# Patient Record
Sex: MALE | Race: WHITE | Employment: OTHER | URBAN - METROPOLITAN AREA
[De-identification: names, ages, dates, MRNs, and addresses within clinical notes are randomized per-mention and may not be internally consistent; named-entity substitution may affect disease eponyms.]

---

## 2022-07-15 ENCOUNTER — HOSPITAL ENCOUNTER (OUTPATIENT)
Age: 72
Setting detail: SPECIMEN
Discharge: HOME | End: 2022-07-15
Payer: OTHER GOVERNMENT

## 2022-07-15 LAB
ALBUMIN SERPL-MCNC: 4.8 GM/DL (ref 3.4–5)
ALP BLD-CCNC: 73 IU/L (ref 40–128)
ALT SERPL-CCNC: <5 U/L (ref 10–40)
ANION GAP SERPL CALCULATED.3IONS-SCNC: 15 MMOL/L (ref 4–16)
AST SERPL-CCNC: <5 IU/L (ref 15–37)
BILIRUB SERPL-MCNC: 0.4 MG/DL (ref 0–1)
BUN BLDV-MCNC: 23 MG/DL (ref 6–23)
CALCIUM SERPL-MCNC: 9.8 MG/DL (ref 8.3–10.6)
CHLORIDE BLD-SCNC: 96 MMOL/L (ref 99–110)
CHOLESTEROL: 689 MG/DL
CO2: 21 MMOL/L (ref 21–32)
CREAT SERPL-MCNC: 1.9 MG/DL (ref 0.9–1.3)
GFR AFRICAN AMERICAN: 43 ML/MIN/1.73M2
GFR NON-AFRICAN AMERICAN: 35 ML/MIN/1.73M2
GLUCOSE BLD-MCNC: 97 MG/DL (ref 70–99)
HDLC SERPL-MCNC: 11 MG/DL
LDL CHOLESTEROL CALCULATED: ABNORMAL MG/DL
LDL CHOLESTEROL DIRECT: 66 MG/DL
POTASSIUM SERPL-SCNC: 3.5 MMOL/L (ref 3.5–5.1)
SODIUM BLD-SCNC: 132 MMOL/L (ref 135–145)
TOTAL PROTEIN: 7.4 GM/DL (ref 6.4–8.2)
TRIGL SERPL-MCNC: 2690 MG/DL

## 2022-07-15 PROCEDURE — 80061 LIPID PANEL: CPT

## 2022-07-15 PROCEDURE — 80053 COMPREHEN METABOLIC PANEL: CPT

## 2022-07-15 PROCEDURE — 83721 ASSAY OF BLOOD LIPOPROTEIN: CPT

## 2022-07-20 ENCOUNTER — HOSPITAL ENCOUNTER (OUTPATIENT)
Age: 72
Setting detail: SPECIMEN
Discharge: HOME | End: 2022-07-20

## 2022-07-20 LAB
AMPHETAMINES: NEGATIVE
BARBITURATE SCREEN URINE: NEGATIVE
BENZODIAZEPINE SCREEN, URINE: NEGATIVE
CANNABINOID SCREEN URINE: ABNORMAL
COCAINE METABOLITE: NEGATIVE
OPIATES, URINE: NEGATIVE
OXYCODONE: NEGATIVE
PHENCYCLIDINE, URINE: NEGATIVE

## 2022-07-20 PROCEDURE — 80307 DRUG TEST PRSMV CHEM ANLYZR: CPT

## 2022-08-10 ENCOUNTER — HOSPITAL ENCOUNTER (OUTPATIENT)
Age: 72
Setting detail: SPECIMEN
Discharge: HOME | End: 2022-08-10
Payer: OTHER GOVERNMENT

## 2022-08-10 LAB
ALBUMIN SERPL-MCNC: 4.5 GM/DL (ref 3.4–5)
ALP BLD-CCNC: 68 IU/L (ref 40–128)
ALT SERPL-CCNC: 7 U/L (ref 10–40)
ANION GAP SERPL CALCULATED.3IONS-SCNC: 18 MMOL/L (ref 4–16)
AST SERPL-CCNC: 12 IU/L (ref 15–37)
BASOPHILS ABSOLUTE: 0.1 K/CU MM
BASOPHILS RELATIVE PERCENT: 1 % (ref 0–1)
BILIRUB SERPL-MCNC: 0.2 MG/DL (ref 0–1)
BUN BLDV-MCNC: 24 MG/DL (ref 6–23)
CALCIUM SERPL-MCNC: 9.6 MG/DL (ref 8.3–10.6)
CHLORIDE BLD-SCNC: 101 MMOL/L (ref 99–110)
CHOLESTEROL: 494 MG/DL
CO2: 16 MMOL/L (ref 21–32)
CREAT SERPL-MCNC: 2.1 MG/DL (ref 0.9–1.3)
DIFFERENTIAL TYPE: ABNORMAL
EOSINOPHILS ABSOLUTE: 0.2 K/CU MM
EOSINOPHILS RELATIVE PERCENT: 2.4 % (ref 0–3)
ESTIMATED AVERAGE GLUCOSE: 111 MG/DL
GFR AFRICAN AMERICAN: 38 ML/MIN/1.73M2
GFR NON-AFRICAN AMERICAN: 31 ML/MIN/1.73M2
GLUCOSE BLD-MCNC: 92 MG/DL (ref 70–99)
HBA1C MFR BLD: 5.5 % (ref 4.2–6.3)
HCT VFR BLD CALC: 43 % (ref 42–52)
HDLC SERPL-MCNC: 10 MG/DL
HEMOGLOBIN: 12.9 GM/DL (ref 13.5–18)
IMMATURE NEUTROPHIL %: 0.7 % (ref 0–0.43)
LDL CHOLESTEROL CALCULATED: ABNORMAL MG/DL
LYMPHOCYTES ABSOLUTE: 2.1 K/CU MM
LYMPHOCYTES RELATIVE PERCENT: 24.4 % (ref 24–44)
MCH RBC QN AUTO: 30.8 PG (ref 27–31)
MCHC RBC AUTO-ENTMCNC: 30 % (ref 32–36)
MCV RBC AUTO: 102.6 FL (ref 78–100)
MONOCYTES ABSOLUTE: 0.7 K/CU MM
MONOCYTES RELATIVE PERCENT: 8.3 % (ref 0–4)
NUCLEATED RBC %: 0 %
PDW BLD-RTO: 14.6 % (ref 11.7–14.9)
PLATELET # BLD: 328 K/CU MM (ref 140–440)
PMV BLD AUTO: 9.9 FL (ref 7.5–11.1)
POTASSIUM SERPL-SCNC: 3.9 MMOL/L (ref 3.5–5.1)
PROSTATE SPECIFIC ANTIGEN: 4.6 NG/ML (ref 0–4)
RBC # BLD: 4.19 M/CU MM (ref 4.6–6.2)
SEGMENTED NEUTROPHILS ABSOLUTE COUNT: 5.5 K/CU MM
SEGMENTED NEUTROPHILS RELATIVE PERCENT: 63.2 % (ref 36–66)
SODIUM BLD-SCNC: 135 MMOL/L (ref 135–145)
TOTAL IMMATURE NEUTOROPHIL: 0.06 K/CU MM
TOTAL NUCLEATED RBC: 0 K/CU MM
TOTAL PROTEIN: 7.3 GM/DL (ref 6.4–8.2)
TRIGL SERPL-MCNC: 1949 MG/DL
WBC # BLD: 8.8 K/CU MM (ref 4–10.5)

## 2022-08-10 PROCEDURE — 85025 COMPLETE CBC W/AUTO DIFF WBC: CPT

## 2022-08-10 PROCEDURE — G0103 PSA SCREENING: HCPCS

## 2022-08-10 PROCEDURE — 83721 ASSAY OF BLOOD LIPOPROTEIN: CPT

## 2022-08-10 PROCEDURE — 80053 COMPREHEN METABOLIC PANEL: CPT

## 2022-08-10 PROCEDURE — 83036 HEMOGLOBIN GLYCOSYLATED A1C: CPT

## 2022-08-10 PROCEDURE — 80061 LIPID PANEL: CPT

## 2022-08-11 ENCOUNTER — HOSPITAL ENCOUNTER (OUTPATIENT)
Age: 72
Setting detail: SPECIMEN
Discharge: HOME | End: 2022-08-11
Payer: OTHER GOVERNMENT

## 2022-08-11 LAB
BACTERIA: NEGATIVE /HPF
BILIRUBIN URINE: NEGATIVE MG/DL
BLOOD, URINE: NEGATIVE
CLARITY: CLEAR
COLOR: YELLOW
GLUCOSE, URINE: NEGATIVE MG/DL
HYALINE CASTS: 2 /LPF
KETONES, URINE: NEGATIVE MG/DL
LEUKOCYTE ESTERASE, URINE: NEGATIVE
MUCUS: ABNORMAL HPF
NITRITE URINE, QUANTITATIVE: NEGATIVE
PH, URINE: 5.5 (ref 5–8)
PROTEIN UA: 100 MG/DL
RBC URINE: 1 /HPF (ref 0–3)
SPECIFIC GRAVITY UA: 1.02 (ref 1–1.03)
SQUAMOUS EPITHELIAL: <1 /HPF
TRICHOMONAS: ABNORMAL /HPF
UROBILINOGEN, URINE: 0.2 MG/DL (ref 0.2–1)
WBC UA: 3 /HPF (ref 0–2)

## 2022-08-11 PROCEDURE — 81001 URINALYSIS AUTO W/SCOPE: CPT

## 2022-08-12 LAB — LDL CHOLESTEROL DIRECT: 53 MG/DL

## 2022-08-17 ENCOUNTER — HOSPITAL ENCOUNTER (OUTPATIENT)
Age: 72
Setting detail: SPECIMEN
Discharge: HOME | End: 2022-08-17

## 2022-08-17 LAB
AMPHETAMINES: NEGATIVE
BARBITURATE SCREEN URINE: NEGATIVE
BENZODIAZEPINE SCREEN, URINE: NEGATIVE
CANNABINOID SCREEN URINE: NEGATIVE
COCAINE METABOLITE: NEGATIVE
OPIATES, URINE: NEGATIVE
OXYCODONE: NEGATIVE
PHENCYCLIDINE, URINE: NEGATIVE

## 2022-08-17 PROCEDURE — 80307 DRUG TEST PRSMV CHEM ANLYZR: CPT

## 2022-09-01 ENCOUNTER — HOSPITAL ENCOUNTER (OUTPATIENT)
Age: 72
Setting detail: SPECIMEN
Discharge: HOME | End: 2022-09-01
Payer: OTHER GOVERNMENT

## 2022-09-01 LAB
ALBUMIN SERPL-MCNC: 4.5 GM/DL (ref 3.4–5)
ALP BLD-CCNC: 56 IU/L (ref 40–128)
ALT SERPL-CCNC: <5 U/L (ref 10–40)
ANION GAP SERPL CALCULATED.3IONS-SCNC: 14 MMOL/L (ref 4–16)
AST SERPL-CCNC: <5 IU/L (ref 15–37)
BASOPHILS ABSOLUTE: 0.1 K/CU MM
BASOPHILS RELATIVE PERCENT: 0.8 % (ref 0–1)
BILIRUB SERPL-MCNC: 0.2 MG/DL (ref 0–1)
BUN BLDV-MCNC: 25 MG/DL (ref 6–23)
CALCIUM SERPL-MCNC: 9.4 MG/DL (ref 8.3–10.6)
CHLORIDE BLD-SCNC: 100 MMOL/L (ref 99–110)
CHOLESTEROL: 770 MG/DL
CO2: 22 MMOL/L (ref 21–32)
CREAT SERPL-MCNC: 2 MG/DL (ref 0.9–1.3)
DIFFERENTIAL TYPE: ABNORMAL
EOSINOPHILS ABSOLUTE: 0.2 K/CU MM
EOSINOPHILS RELATIVE PERCENT: 3.1 % (ref 0–3)
GFR AFRICAN AMERICAN: 40 ML/MIN/1.73M2
GFR NON-AFRICAN AMERICAN: 33 ML/MIN/1.73M2
GLUCOSE BLD-MCNC: 88 MG/DL (ref 70–99)
HCT VFR BLD CALC: 41.1 % (ref 42–52)
HDLC SERPL-MCNC: 3 MG/DL
HEMOGLOBIN: 12.8 GM/DL (ref 13.5–18)
IMMATURE NEUTROPHIL %: 0.7 % (ref 0–0.43)
LDL CHOLESTEROL CALCULATED: ABNORMAL MG/DL
LDL CHOLESTEROL DIRECT: 46 MG/DL
LYMPHOCYTES ABSOLUTE: 1.9 K/CU MM
LYMPHOCYTES RELATIVE PERCENT: 26.3 % (ref 24–44)
MCH RBC QN AUTO: 31 PG (ref 27–31)
MCHC RBC AUTO-ENTMCNC: 31.1 % (ref 32–36)
MCV RBC AUTO: 99.5 FL (ref 78–100)
MONOCYTES ABSOLUTE: 0.5 K/CU MM
MONOCYTES RELATIVE PERCENT: 7.5 % (ref 0–4)
NUCLEATED RBC %: 0 %
PDW BLD-RTO: 15 % (ref 11.7–14.9)
PLATELET # BLD: 256 K/CU MM (ref 140–440)
PMV BLD AUTO: 10.5 FL (ref 7.5–11.1)
POTASSIUM SERPL-SCNC: 3.9 MMOL/L (ref 3.5–5.1)
PSA ULTRASENSITIVE: 3.23 NG/ML (ref 0–4)
RBC # BLD: 4.13 M/CU MM (ref 4.6–6.2)
SEGMENTED NEUTROPHILS ABSOLUTE COUNT: 4.4 K/CU MM
SEGMENTED NEUTROPHILS RELATIVE PERCENT: 61.6 % (ref 36–66)
SODIUM BLD-SCNC: 136 MMOL/L (ref 135–145)
T4 FREE: 0.66 NG/DL (ref 0.9–1.8)
TOTAL IMMATURE NEUTOROPHIL: 0.05 K/CU MM
TOTAL NUCLEATED RBC: 0 K/CU MM
TOTAL PROTEIN: 6.7 GM/DL (ref 6.4–8.2)
TRIGL SERPL-MCNC: 3327 MG/DL
TSH HIGH SENSITIVITY: 1.07 UIU/ML (ref 0.27–4.2)
WBC # BLD: 7.1 K/CU MM (ref 4–10.5)

## 2022-09-01 PROCEDURE — 83721 ASSAY OF BLOOD LIPOPROTEIN: CPT

## 2022-09-01 PROCEDURE — 84153 ASSAY OF PSA TOTAL: CPT

## 2022-09-01 PROCEDURE — 80061 LIPID PANEL: CPT

## 2022-09-01 PROCEDURE — 80053 COMPREHEN METABOLIC PANEL: CPT

## 2022-09-01 PROCEDURE — 84443 ASSAY THYROID STIM HORMONE: CPT

## 2022-09-01 PROCEDURE — 84439 ASSAY OF FREE THYROXINE: CPT

## 2022-09-01 PROCEDURE — 85025 COMPLETE CBC W/AUTO DIFF WBC: CPT

## 2023-05-03 ENCOUNTER — APPOINTMENT (OUTPATIENT)
Dept: GENERAL RADIOLOGY | Age: 73
End: 2023-05-03
Payer: OTHER GOVERNMENT

## 2023-05-03 ENCOUNTER — HOSPITAL ENCOUNTER (INPATIENT)
Age: 73
LOS: 10 days | Discharge: NURSING, REHAB OR LONG-TERM CARE | End: 2023-05-13
Attending: EMERGENCY MEDICINE
Payer: OTHER GOVERNMENT

## 2023-05-03 DIAGNOSIS — N17.9 AKI (ACUTE KIDNEY INJURY) (HCC): ICD-10-CM

## 2023-05-03 DIAGNOSIS — R94.31 ABNORMAL EKG: ICD-10-CM

## 2023-05-03 DIAGNOSIS — I16.0 HYPERTENSIVE URGENCY: Primary | ICD-10-CM

## 2023-05-03 PROBLEM — N40.0 BENIGN PROSTATE HYPERPLASIA: Status: ACTIVE | Noted: 2023-05-03

## 2023-05-03 PROBLEM — I63.9 CVA (CEREBRAL VASCULAR ACCIDENT) (HCC): Status: ACTIVE | Noted: 2023-05-03

## 2023-05-03 PROBLEM — I66.9 CEREBRAL ARTERY OCCLUSION: Status: ACTIVE | Noted: 2023-05-03

## 2023-05-03 LAB
ALBUMIN SERPL-MCNC: 4.1 GM/DL (ref 3.4–5)
ALP BLD-CCNC: 123 IU/L (ref 40–129)
ALT SERPL-CCNC: 5 U/L (ref 10–40)
ANION GAP SERPL CALCULATED.3IONS-SCNC: 17 MMOL/L (ref 4–16)
AST SERPL-CCNC: 17 IU/L (ref 15–37)
BACTERIA: NEGATIVE /HPF
BASOPHILS ABSOLUTE: 0.1 K/CU MM
BASOPHILS RELATIVE PERCENT: 1.2 % (ref 0–1)
BILIRUB SERPL-MCNC: 0.2 MG/DL (ref 0–1)
BILIRUBIN URINE: NEGATIVE MG/DL
BLOOD, URINE: ABNORMAL
BUN SERPL-MCNC: 25 MG/DL (ref 6–23)
CALCIUM SERPL-MCNC: 8.8 MG/DL (ref 8.3–10.6)
CHLORIDE BLD-SCNC: 97 MMOL/L (ref 99–110)
CLARITY: CLEAR
CO2: 20 MMOL/L (ref 21–32)
COLOR: YELLOW
CREAT SERPL-MCNC: 1.9 MG/DL (ref 0.9–1.3)
DIFFERENTIAL TYPE: ABNORMAL
EOSINOPHILS ABSOLUTE: 0.2 K/CU MM
EOSINOPHILS RELATIVE PERCENT: 2.5 % (ref 0–3)
GFR SERPL CREATININE-BSD FRML MDRD: 37 ML/MIN/1.73M2
GLUCOSE SERPL-MCNC: 133 MG/DL (ref 70–99)
GLUCOSE, URINE: NEGATIVE MG/DL
HCT VFR BLD CALC: 44.5 % (ref 42–52)
HEMOGLOBIN: 14.8 GM/DL (ref 13.5–18)
HYALINE CASTS: 0 /LPF
IMMATURE NEUTROPHIL %: 0.4 % (ref 0–0.43)
KETONES, URINE: NEGATIVE MG/DL
LEUKOCYTE ESTERASE, URINE: NEGATIVE
LYMPHOCYTES ABSOLUTE: 2 K/CU MM
LYMPHOCYTES RELATIVE PERCENT: 22 % (ref 24–44)
MAGNESIUM: 1.9 MG/DL (ref 1.8–2.4)
MCH RBC QN AUTO: 30.4 PG (ref 27–31)
MCHC RBC AUTO-ENTMCNC: 33.3 % (ref 32–36)
MCV RBC AUTO: 91.4 FL (ref 78–100)
MONOCYTES ABSOLUTE: 0.7 K/CU MM
MONOCYTES RELATIVE PERCENT: 7.4 % (ref 0–4)
MUCUS: ABNORMAL HPF
NITRITE URINE, QUANTITATIVE: NEGATIVE
NUCLEATED RBC %: 0 %
PDW BLD-RTO: 15.9 % (ref 11.7–14.9)
PH, URINE: 6 (ref 5–8)
PLATELET # BLD: 276 K/CU MM (ref 140–440)
PMV BLD AUTO: 9.9 FL (ref 7.5–11.1)
POTASSIUM SERPL-SCNC: 3.4 MMOL/L (ref 3.5–5.1)
PRO-BNP: 174 PG/ML
PROTEIN UA: >300 MG/DL
RBC # BLD: 4.87 M/CU MM (ref 4.6–6.2)
RBC URINE: 1 /HPF (ref 0–3)
REASON FOR REJECTION: NORMAL
REASON FOR REJECTION: NORMAL
REJECTED TEST: NORMAL
REJECTED TEST: NORMAL
SEGMENTED NEUTROPHILS ABSOLUTE COUNT: 6.1 K/CU MM
SEGMENTED NEUTROPHILS RELATIVE PERCENT: 66.5 % (ref 36–66)
SODIUM BLD-SCNC: 134 MMOL/L (ref 135–145)
SPECIFIC GRAVITY UA: 1.02 (ref 1–1.03)
TOTAL IMMATURE NEUTOROPHIL: 0.04 K/CU MM
TOTAL NUCLEATED RBC: 0 K/CU MM
TOTAL PROTEIN: 6.9 GM/DL (ref 6.4–8.2)
TRICHOMONAS: ABNORMAL /HPF
TROPONIN T: 0.03 NG/ML
TROPONIN T: 0.03 NG/ML
UROBILINOGEN, URINE: 0.2 MG/DL (ref 0.2–1)
WBC # BLD: 9.2 K/CU MM (ref 4–10.5)
WBC UA: 1 /HPF (ref 0–2)

## 2023-05-03 PROCEDURE — 83880 ASSAY OF NATRIURETIC PEPTIDE: CPT

## 2023-05-03 PROCEDURE — 2580000003 HC RX 258: Performed by: PHYSICIAN ASSISTANT

## 2023-05-03 PROCEDURE — 94761 N-INVAS EAR/PLS OXIMETRY MLT: CPT

## 2023-05-03 PROCEDURE — 6370000000 HC RX 637 (ALT 250 FOR IP): Performed by: PHYSICIAN ASSISTANT

## 2023-05-03 PROCEDURE — 81001 URINALYSIS AUTO W/SCOPE: CPT

## 2023-05-03 PROCEDURE — 87086 URINE CULTURE/COLONY COUNT: CPT

## 2023-05-03 PROCEDURE — 80053 COMPREHEN METABOLIC PANEL: CPT

## 2023-05-03 PROCEDURE — 81003 URINALYSIS AUTO W/O SCOPE: CPT

## 2023-05-03 PROCEDURE — 6370000000 HC RX 637 (ALT 250 FOR IP): Performed by: INTERNAL MEDICINE

## 2023-05-03 PROCEDURE — 71045 X-RAY EXAM CHEST 1 VIEW: CPT

## 2023-05-03 PROCEDURE — 2580000003 HC RX 258

## 2023-05-03 PROCEDURE — 85025 COMPLETE CBC W/AUTO DIFF WBC: CPT

## 2023-05-03 PROCEDURE — 96365 THER/PROPH/DIAG IV INF INIT: CPT

## 2023-05-03 PROCEDURE — 2500000003 HC RX 250 WO HCPCS: Performed by: PHYSICIAN ASSISTANT

## 2023-05-03 PROCEDURE — 2500000003 HC RX 250 WO HCPCS

## 2023-05-03 PROCEDURE — 2140000000 HC CCU INTERMEDIATE R&B

## 2023-05-03 PROCEDURE — 93005 ELECTROCARDIOGRAM TRACING: CPT | Performed by: PHYSICIAN ASSISTANT

## 2023-05-03 PROCEDURE — 84484 ASSAY OF TROPONIN QUANT: CPT

## 2023-05-03 PROCEDURE — 6370000000 HC RX 637 (ALT 250 FOR IP)

## 2023-05-03 PROCEDURE — 83735 ASSAY OF MAGNESIUM: CPT

## 2023-05-03 PROCEDURE — 99285 EMERGENCY DEPT VISIT HI MDM: CPT

## 2023-05-03 PROCEDURE — 6360000002 HC RX W HCPCS

## 2023-05-03 RX ORDER — TAMSULOSIN HYDROCHLORIDE 0.4 MG/1
0.4 CAPSULE ORAL DAILY
Status: DISCONTINUED | OUTPATIENT
Start: 2023-05-03 | End: 2023-05-13 | Stop reason: HOSPADM

## 2023-05-03 RX ORDER — ASPIRIN 81 MG/1
81 TABLET, CHEWABLE ORAL DAILY
Status: DISCONTINUED | OUTPATIENT
Start: 2023-05-04 | End: 2023-05-10

## 2023-05-03 RX ORDER — FENOFIBRATE 54 MG/1
54 TABLET ORAL DAILY
Status: DISCONTINUED | OUTPATIENT
Start: 2023-05-03 | End: 2023-05-13 | Stop reason: HOSPADM

## 2023-05-03 RX ORDER — AMLODIPINE BESYLATE 10 MG/1
10 TABLET ORAL DAILY
Status: DISCONTINUED | OUTPATIENT
Start: 2023-05-04 | End: 2023-05-13 | Stop reason: HOSPADM

## 2023-05-03 RX ORDER — FENOFIBRATE 48 MG/1
48 TABLET, COATED ORAL DAILY
COMMUNITY

## 2023-05-03 RX ORDER — AMLODIPINE BESYLATE 10 MG/1
10 TABLET ORAL DAILY
COMMUNITY

## 2023-05-03 RX ORDER — LORAZEPAM 0.5 MG/1
0.25 TABLET ORAL 4 TIMES DAILY
Status: DISCONTINUED | OUTPATIENT
Start: 2023-05-03 | End: 2023-05-13 | Stop reason: HOSPADM

## 2023-05-03 RX ORDER — VALSARTAN 40 MG/1
80 TABLET ORAL DAILY
Status: DISCONTINUED | OUTPATIENT
Start: 2023-05-03 | End: 2023-05-05

## 2023-05-03 RX ORDER — SODIUM CHLORIDE 0.9 % (FLUSH) 0.9 %
5-40 SYRINGE (ML) INJECTION PRN
Status: DISCONTINUED | OUTPATIENT
Start: 2023-05-03 | End: 2023-05-13 | Stop reason: HOSPADM

## 2023-05-03 RX ORDER — TRAZODONE HYDROCHLORIDE 50 MG/1
100 TABLET ORAL NIGHTLY
COMMUNITY

## 2023-05-03 RX ORDER — SODIUM CHLORIDE 9 MG/ML
INJECTION, SOLUTION INTRAVENOUS PRN
Status: DISCONTINUED | OUTPATIENT
Start: 2023-05-03 | End: 2023-05-13 | Stop reason: HOSPADM

## 2023-05-03 RX ORDER — POTASSIUM CHLORIDE 20 MEQ/1
20 TABLET, EXTENDED RELEASE ORAL 2 TIMES DAILY WITH MEALS
Status: DISCONTINUED | OUTPATIENT
Start: 2023-05-03 | End: 2023-05-04

## 2023-05-03 RX ORDER — SODIUM CHLORIDE 0.9 % (FLUSH) 0.9 %
5-40 SYRINGE (ML) INJECTION EVERY 12 HOURS SCHEDULED
Status: DISCONTINUED | OUTPATIENT
Start: 2023-05-03 | End: 2023-05-13 | Stop reason: HOSPADM

## 2023-05-03 RX ORDER — ICOSAPENT ETHYL 1000 MG/1
2 CAPSULE ORAL 2 TIMES DAILY
COMMUNITY

## 2023-05-03 RX ORDER — ENOXAPARIN SODIUM 100 MG/ML
40 INJECTION SUBCUTANEOUS DAILY
Status: DISCONTINUED | OUTPATIENT
Start: 2023-05-03 | End: 2023-05-04

## 2023-05-03 RX ORDER — ASPIRIN 81 MG/1
81 TABLET ORAL DAILY
Status: DISCONTINUED | OUTPATIENT
Start: 2023-05-03 | End: 2023-05-03

## 2023-05-03 RX ORDER — LORAZEPAM 0.5 MG/1
0.5 TABLET ORAL ONCE
Status: COMPLETED | OUTPATIENT
Start: 2023-05-03 | End: 2023-05-03

## 2023-05-03 RX ORDER — POLYETHYLENE GLYCOL 3350 17 G/17G
17 POWDER, FOR SOLUTION ORAL DAILY PRN
Status: DISCONTINUED | OUTPATIENT
Start: 2023-05-03 | End: 2023-05-13 | Stop reason: HOSPADM

## 2023-05-03 RX ORDER — ACETAMINOPHEN 650 MG/1
650 SUPPOSITORY RECTAL EVERY 6 HOURS PRN
Status: DISCONTINUED | OUTPATIENT
Start: 2023-05-03 | End: 2023-05-13 | Stop reason: HOSPADM

## 2023-05-03 RX ORDER — METOPROLOL TARTRATE 50 MG/1
25 TABLET, FILM COATED ORAL 2 TIMES DAILY
COMMUNITY

## 2023-05-03 RX ORDER — ASPIRIN 325 MG
325 TABLET ORAL ONCE
Status: COMPLETED | OUTPATIENT
Start: 2023-05-03 | End: 2023-05-03

## 2023-05-03 RX ORDER — TAMSULOSIN HYDROCHLORIDE 0.4 MG/1
0.4 CAPSULE ORAL DAILY
COMMUNITY

## 2023-05-03 RX ORDER — ONDANSETRON 4 MG/1
4 TABLET, ORALLY DISINTEGRATING ORAL EVERY 8 HOURS PRN
Status: DISCONTINUED | OUTPATIENT
Start: 2023-05-03 | End: 2023-05-13 | Stop reason: HOSPADM

## 2023-05-03 RX ORDER — VALSARTAN 80 MG/1
80 TABLET ORAL DAILY
COMMUNITY

## 2023-05-03 RX ORDER — MIRTAZAPINE 15 MG/1
30 TABLET, FILM COATED ORAL NIGHTLY
Status: DISCONTINUED | OUTPATIENT
Start: 2023-05-03 | End: 2023-05-13 | Stop reason: HOSPADM

## 2023-05-03 RX ORDER — MIRTAZAPINE 30 MG/1
30 TABLET, FILM COATED ORAL NIGHTLY
COMMUNITY

## 2023-05-03 RX ORDER — ACETAMINOPHEN 325 MG/1
650 TABLET ORAL EVERY 6 HOURS PRN
Status: DISCONTINUED | OUTPATIENT
Start: 2023-05-03 | End: 2023-05-10

## 2023-05-03 RX ORDER — ONDANSETRON 2 MG/ML
4 INJECTION INTRAMUSCULAR; INTRAVENOUS EVERY 6 HOURS PRN
Status: DISCONTINUED | OUTPATIENT
Start: 2023-05-03 | End: 2023-05-13 | Stop reason: HOSPADM

## 2023-05-03 RX ORDER — SERTRALINE HYDROCHLORIDE 100 MG/1
150 TABLET, FILM COATED ORAL DAILY
COMMUNITY

## 2023-05-03 RX ORDER — ASPIRIN 81 MG/1
81 TABLET, CHEWABLE ORAL DAILY
Status: DISCONTINUED | OUTPATIENT
Start: 2023-05-03 | End: 2023-05-03

## 2023-05-03 RX ORDER — ASPIRIN 81 MG/1
81 TABLET ORAL DAILY
COMMUNITY

## 2023-05-03 RX ADMIN — VALSARTAN 80 MG: 40 TABLET ORAL at 19:04

## 2023-05-03 RX ADMIN — SODIUM CHLORIDE 10 MG/HR: 9 INJECTION, SOLUTION INTRAVENOUS at 19:32

## 2023-05-03 RX ADMIN — METOPROLOL TARTRATE 25 MG: 25 TABLET, FILM COATED ORAL at 21:46

## 2023-05-03 RX ADMIN — ENOXAPARIN SODIUM 40 MG: 100 INJECTION SUBCUTANEOUS at 19:04

## 2023-05-03 RX ADMIN — SERTRALINE HYDROCHLORIDE 150 MG: 50 TABLET ORAL at 19:04

## 2023-05-03 RX ADMIN — MIRTAZAPINE 30 MG: 15 TABLET, FILM COATED ORAL at 21:46

## 2023-05-03 RX ADMIN — FENOFIBRATE 54 MG: 54 TABLET, FILM COATED ORAL at 19:04

## 2023-05-03 RX ADMIN — POTASSIUM CHLORIDE 20 MEQ: 1500 TABLET, EXTENDED RELEASE ORAL at 19:04

## 2023-05-03 RX ADMIN — SODIUM CHLORIDE 5 MG/HR: 9 INJECTION, SOLUTION INTRAVENOUS at 22:46

## 2023-05-03 RX ADMIN — SODIUM CHLORIDE 7.5 MG/HR: 9 INJECTION, SOLUTION INTRAVENOUS at 16:59

## 2023-05-03 RX ADMIN — LORAZEPAM 0.25 MG: 0.5 TABLET ORAL at 21:46

## 2023-05-03 RX ADMIN — TAMSULOSIN HYDROCHLORIDE 0.4 MG: 0.4 CAPSULE ORAL at 19:04

## 2023-05-03 RX ADMIN — ASPIRIN 325 MG: 325 TABLET ORAL at 14:05

## 2023-05-03 RX ADMIN — SODIUM CHLORIDE 5 MG/HR: 9 INJECTION, SOLUTION INTRAVENOUS at 13:23

## 2023-05-03 RX ADMIN — LORAZEPAM 0.5 MG: 0.5 TABLET ORAL at 19:04

## 2023-05-03 ASSESSMENT — ENCOUNTER SYMPTOMS
EYES NEGATIVE: 1
GASTROINTESTINAL NEGATIVE: 1
ALLERGIC/IMMUNOLOGIC NEGATIVE: 1
RESPIRATORY NEGATIVE: 1

## 2023-05-04 ENCOUNTER — APPOINTMENT (OUTPATIENT)
Dept: ULTRASOUND IMAGING | Age: 73
End: 2023-05-04
Payer: OTHER GOVERNMENT

## 2023-05-04 LAB
ALBUMIN SERPL-MCNC: 3.5 GM/DL (ref 3.4–5)
ALP BLD-CCNC: 101 IU/L (ref 40–128)
ALT SERPL-CCNC: 5 U/L (ref 10–40)
ANION GAP SERPL CALCULATED.3IONS-SCNC: 13 MMOL/L (ref 4–16)
APTT: 48.3 SECONDS (ref 25.1–37.1)
AST SERPL-CCNC: 8 IU/L (ref 15–37)
BASOPHILS ABSOLUTE: 0.1 K/CU MM
BASOPHILS RELATIVE PERCENT: 0.9 % (ref 0–1)
BILIRUB SERPL-MCNC: 0.2 MG/DL (ref 0–1)
BUN SERPL-MCNC: 24 MG/DL (ref 6–23)
CALCIUM SERPL-MCNC: 8.4 MG/DL (ref 8.3–10.6)
CHLORIDE BLD-SCNC: 106 MMOL/L (ref 99–110)
CO2: 20 MMOL/L (ref 21–32)
CREAT SERPL-MCNC: 2 MG/DL (ref 0.9–1.3)
CULTURE: NORMAL
DIFFERENTIAL TYPE: ABNORMAL
EOSINOPHILS ABSOLUTE: 0.3 K/CU MM
EOSINOPHILS RELATIVE PERCENT: 3.3 % (ref 0–3)
GFR SERPL CREATININE-BSD FRML MDRD: 35 ML/MIN/1.73M2
GLUCOSE SERPL-MCNC: 108 MG/DL (ref 70–99)
HAV IGM SERPL QL IA: NON REACTIVE
HBV CORE IGM SERPL QL IA: NON REACTIVE
HBV SURFACE AG SERPL QL IA: NON REACTIVE
HCT VFR BLD CALC: 40.9 % (ref 42–52)
HCV AB SERPL QL IA: NON REACTIVE
HEMOGLOBIN: 13.1 GM/DL (ref 13.5–18)
IMMATURE NEUTROPHIL %: 0.6 % (ref 0–0.43)
LV EF: 55 %
LVEF MODALITY: NORMAL
LYMPHOCYTES ABSOLUTE: 2.3 K/CU MM
LYMPHOCYTES RELATIVE PERCENT: 26.6 % (ref 24–44)
Lab: NORMAL
MCH RBC QN AUTO: 29.7 PG (ref 27–31)
MCHC RBC AUTO-ENTMCNC: 32 % (ref 32–36)
MCV RBC AUTO: 92.7 FL (ref 78–100)
MONOCYTES ABSOLUTE: 0.7 K/CU MM
MONOCYTES RELATIVE PERCENT: 7.9 % (ref 0–4)
NUCLEATED RBC %: 0 %
PDW BLD-RTO: 15.9 % (ref 11.7–14.9)
PLATELET # BLD: 224 K/CU MM (ref 140–440)
PMV BLD AUTO: 10 FL (ref 7.5–11.1)
POTASSIUM SERPL-SCNC: 3.5 MMOL/L (ref 3.5–5.1)
RBC # BLD: 4.41 M/CU MM (ref 4.6–6.2)
SEGMENTED NEUTROPHILS ABSOLUTE COUNT: 5.3 K/CU MM
SEGMENTED NEUTROPHILS RELATIVE PERCENT: 60.7 % (ref 36–66)
SODIUM BLD-SCNC: 139 MMOL/L (ref 135–145)
SPECIMEN: NORMAL
TOTAL IMMATURE NEUTOROPHIL: 0.05 K/CU MM
TOTAL NUCLEATED RBC: 0 K/CU MM
TOTAL PROTEIN: 5.6 GM/DL (ref 6.4–8.2)
TROPONIN T: 0.05 NG/ML
TROPONIN T: 0.06 NG/ML
WBC # BLD: 8.7 K/CU MM (ref 4–10.5)

## 2023-05-04 PROCEDURE — 6370000000 HC RX 637 (ALT 250 FOR IP): Performed by: INTERNAL MEDICINE

## 2023-05-04 PROCEDURE — 99254 IP/OBS CNSLTJ NEW/EST MOD 60: CPT | Performed by: INTERNAL MEDICINE

## 2023-05-04 PROCEDURE — 80053 COMPREHEN METABOLIC PANEL: CPT

## 2023-05-04 PROCEDURE — 84165 PROTEIN E-PHORESIS SERUM: CPT

## 2023-05-04 PROCEDURE — 6370000000 HC RX 637 (ALT 250 FOR IP)

## 2023-05-04 PROCEDURE — 2140000000 HC CCU INTERMEDIATE R&B

## 2023-05-04 PROCEDURE — 84166 PROTEIN E-PHORESIS/URINE/CSF: CPT

## 2023-05-04 PROCEDURE — 85730 THROMBOPLASTIN TIME PARTIAL: CPT

## 2023-05-04 PROCEDURE — 84155 ASSAY OF PROTEIN SERUM: CPT

## 2023-05-04 PROCEDURE — 80074 ACUTE HEPATITIS PANEL: CPT

## 2023-05-04 PROCEDURE — 93005 ELECTROCARDIOGRAM TRACING: CPT

## 2023-05-04 PROCEDURE — 93306 TTE W/DOPPLER COMPLETE: CPT

## 2023-05-04 PROCEDURE — 36415 COLL VENOUS BLD VENIPUNCTURE: CPT

## 2023-05-04 PROCEDURE — 81050 URINALYSIS VOLUME MEASURE: CPT

## 2023-05-04 PROCEDURE — 76775 US EXAM ABDO BACK WALL LIM: CPT

## 2023-05-04 PROCEDURE — 6360000002 HC RX W HCPCS

## 2023-05-04 PROCEDURE — 2580000003 HC RX 258

## 2023-05-04 PROCEDURE — 2500000003 HC RX 250 WO HCPCS

## 2023-05-04 PROCEDURE — 84156 ASSAY OF PROTEIN URINE: CPT

## 2023-05-04 PROCEDURE — 94761 N-INVAS EAR/PLS OXIMETRY MLT: CPT

## 2023-05-04 PROCEDURE — 85025 COMPLETE CBC W/AUTO DIFF WBC: CPT

## 2023-05-04 PROCEDURE — 84484 ASSAY OF TROPONIN QUANT: CPT

## 2023-05-04 RX ORDER — HEPARIN SODIUM 10000 [USP'U]/100ML
5-30 INJECTION, SOLUTION INTRAVENOUS CONTINUOUS
Status: DISCONTINUED | OUTPATIENT
Start: 2023-05-04 | End: 2023-05-06

## 2023-05-04 RX ORDER — HEPARIN SODIUM 1000 [USP'U]/ML
60 INJECTION, SOLUTION INTRAVENOUS; SUBCUTANEOUS PRN
Status: DISCONTINUED | OUTPATIENT
Start: 2023-05-04 | End: 2023-05-05

## 2023-05-04 RX ORDER — HEPARIN SODIUM 1000 [USP'U]/ML
30 INJECTION, SOLUTION INTRAVENOUS; SUBCUTANEOUS PRN
Status: DISCONTINUED | OUTPATIENT
Start: 2023-05-04 | End: 2023-05-05

## 2023-05-04 RX ORDER — HEPARIN SODIUM 1000 [USP'U]/ML
4000 INJECTION, SOLUTION INTRAVENOUS; SUBCUTANEOUS ONCE
Status: COMPLETED | OUTPATIENT
Start: 2023-05-04 | End: 2023-05-04

## 2023-05-04 RX ORDER — SODIUM BICARBONATE 650 MG/1
1300 TABLET ORAL 3 TIMES DAILY
Status: COMPLETED | OUTPATIENT
Start: 2023-05-04 | End: 2023-05-04

## 2023-05-04 RX ADMIN — SODIUM CHLORIDE, PRESERVATIVE FREE 10 ML: 5 INJECTION INTRAVENOUS at 09:26

## 2023-05-04 RX ADMIN — SODIUM BICARBONATE 1300 MG: 650 TABLET ORAL at 09:21

## 2023-05-04 RX ADMIN — AMLODIPINE BESYLATE 10 MG: 10 TABLET ORAL at 09:22

## 2023-05-04 RX ADMIN — TAMSULOSIN HYDROCHLORIDE 0.4 MG: 0.4 CAPSULE ORAL at 09:21

## 2023-05-04 RX ADMIN — SODIUM CHLORIDE, PRESERVATIVE FREE 10 ML: 5 INJECTION INTRAVENOUS at 20:46

## 2023-05-04 RX ADMIN — LORAZEPAM 0.25 MG: 0.5 TABLET ORAL at 20:48

## 2023-05-04 RX ADMIN — ACETAMINOPHEN 650 MG: 325 TABLET ORAL at 20:46

## 2023-05-04 RX ADMIN — HEPARIN SODIUM 12 UNITS/KG/HR: 10000 INJECTION, SOLUTION INTRAVENOUS at 16:11

## 2023-05-04 RX ADMIN — SODIUM BICARBONATE 1300 MG: 650 TABLET ORAL at 16:03

## 2023-05-04 RX ADMIN — METOPROLOL TARTRATE 25 MG: 25 TABLET, FILM COATED ORAL at 20:47

## 2023-05-04 RX ADMIN — SERTRALINE HYDROCHLORIDE 150 MG: 50 TABLET ORAL at 09:22

## 2023-05-04 RX ADMIN — METOPROLOL TARTRATE 25 MG: 25 TABLET, FILM COATED ORAL at 09:22

## 2023-05-04 RX ADMIN — FENOFIBRATE 54 MG: 54 TABLET, FILM COATED ORAL at 12:01

## 2023-05-04 RX ADMIN — HEPARIN SODIUM 4000 UNITS: 1000 INJECTION INTRAVENOUS; SUBCUTANEOUS at 16:08

## 2023-05-04 RX ADMIN — MIRTAZAPINE 30 MG: 15 TABLET, FILM COATED ORAL at 20:47

## 2023-05-04 RX ADMIN — ENOXAPARIN SODIUM 40 MG: 100 INJECTION SUBCUTANEOUS at 09:22

## 2023-05-04 RX ADMIN — LORAZEPAM 0.25 MG: 0.5 TABLET ORAL at 09:22

## 2023-05-04 RX ADMIN — LORAZEPAM 0.25 MG: 0.5 TABLET ORAL at 16:03

## 2023-05-04 RX ADMIN — SODIUM BICARBONATE 1300 MG: 650 TABLET ORAL at 20:47

## 2023-05-04 RX ADMIN — VALSARTAN 80 MG: 40 TABLET ORAL at 09:21

## 2023-05-04 RX ADMIN — ASPIRIN 81 MG CHEWABLE TABLET 81 MG: 81 TABLET CHEWABLE at 09:22

## 2023-05-04 ASSESSMENT — PAIN SCALES - GENERAL: PAINLEVEL_OUTOF10: 4

## 2023-05-04 ASSESSMENT — PAIN DESCRIPTION - DESCRIPTORS: DESCRIPTORS: ACHING

## 2023-05-04 ASSESSMENT — PAIN DESCRIPTION - LOCATION: LOCATION: HEAD

## 2023-05-05 LAB
ANION GAP SERPL CALCULATED.3IONS-SCNC: 13 MMOL/L (ref 4–16)
APTT: 36.2 SECONDS (ref 25.1–37.1)
APTT: 46.9 SECONDS (ref 25.1–37.1)
APTT: 63.1 SECONDS (ref 25.1–37.1)
BUN SERPL-MCNC: 22 MG/DL (ref 6–23)
CALCIUM SERPL-MCNC: 8.2 MG/DL (ref 8.3–10.6)
CHLORIDE BLD-SCNC: 103 MMOL/L (ref 99–110)
CO2: 22 MMOL/L (ref 21–32)
CREAT SERPL-MCNC: 1.7 MG/DL (ref 0.9–1.3)
EKG ATRIAL RATE: 65 BPM
EKG ATRIAL RATE: 88 BPM
EKG DIAGNOSIS: NORMAL
EKG DIAGNOSIS: NORMAL
EKG P AXIS: 58 DEGREES
EKG P AXIS: 68 DEGREES
EKG P-R INTERVAL: 192 MS
EKG P-R INTERVAL: 218 MS
EKG Q-T INTERVAL: 440 MS
EKG Q-T INTERVAL: 472 MS
EKG QRS DURATION: 104 MS
EKG QRS DURATION: 106 MS
EKG QTC CALCULATION (BAZETT): 490 MS
EKG QTC CALCULATION (BAZETT): 532 MS
EKG R AXIS: 32 DEGREES
EKG R AXIS: 59 DEGREES
EKG T AXIS: -65 DEGREES
EKG T AXIS: 28 DEGREES
EKG VENTRICULAR RATE: 65 BPM
EKG VENTRICULAR RATE: 88 BPM
GFR SERPL CREATININE-BSD FRML MDRD: 43 ML/MIN/1.73M2
GLUCOSE SERPL-MCNC: 110 MG/DL (ref 70–99)
HCT VFR BLD CALC: 41.3 % (ref 42–52)
HEMOGLOBIN: 13.5 GM/DL (ref 13.5–18)
Lab: 24 HRS
MCH RBC QN AUTO: 29.9 PG (ref 27–31)
MCHC RBC AUTO-ENTMCNC: 32.7 % (ref 32–36)
MCV RBC AUTO: 91.4 FL (ref 78–100)
PDW BLD-RTO: 15.8 % (ref 11.7–14.9)
PLATELET # BLD: 217 K/CU MM (ref 140–440)
PMV BLD AUTO: 9.7 FL (ref 7.5–11.1)
POTASSIUM SERPL-SCNC: 3.4 MMOL/L (ref 3.5–5.1)
RBC # BLD: 4.52 M/CU MM (ref 4.6–6.2)
SODIUM BLD-SCNC: 138 MMOL/L (ref 135–145)
VOLUME, (UVOL): 3000 MLS
WBC # BLD: 7 K/CU MM (ref 4–10.5)

## 2023-05-05 PROCEDURE — 93010 ELECTROCARDIOGRAM REPORT: CPT | Performed by: INTERNAL MEDICINE

## 2023-05-05 PROCEDURE — 99232 SBSQ HOSP IP/OBS MODERATE 35: CPT | Performed by: INTERNAL MEDICINE

## 2023-05-05 PROCEDURE — 94761 N-INVAS EAR/PLS OXIMETRY MLT: CPT

## 2023-05-05 PROCEDURE — 2140000000 HC CCU INTERMEDIATE R&B

## 2023-05-05 PROCEDURE — 6360000002 HC RX W HCPCS

## 2023-05-05 PROCEDURE — 36415 COLL VENOUS BLD VENIPUNCTURE: CPT

## 2023-05-05 PROCEDURE — APPSS30 APP SPLIT SHARED TIME 16-30 MINUTES

## 2023-05-05 PROCEDURE — 6360000002 HC RX W HCPCS: Performed by: FAMILY MEDICINE

## 2023-05-05 PROCEDURE — 6370000000 HC RX 637 (ALT 250 FOR IP): Performed by: INTERNAL MEDICINE

## 2023-05-05 PROCEDURE — 85730 THROMBOPLASTIN TIME PARTIAL: CPT

## 2023-05-05 PROCEDURE — 6370000000 HC RX 637 (ALT 250 FOR IP)

## 2023-05-05 PROCEDURE — 85027 COMPLETE CBC AUTOMATED: CPT

## 2023-05-05 PROCEDURE — 80048 BASIC METABOLIC PNL TOTAL CA: CPT

## 2023-05-05 PROCEDURE — 2580000003 HC RX 258

## 2023-05-05 PROCEDURE — 2500000003 HC RX 250 WO HCPCS

## 2023-05-05 RX ORDER — HYDRALAZINE HYDROCHLORIDE 20 MG/ML
10 INJECTION INTRAMUSCULAR; INTRAVENOUS ONCE
Status: COMPLETED | OUTPATIENT
Start: 2023-05-05 | End: 2023-05-05

## 2023-05-05 RX ORDER — HEPARIN SODIUM 1000 [USP'U]/ML
2000 INJECTION, SOLUTION INTRAVENOUS; SUBCUTANEOUS PRN
Status: DISCONTINUED | OUTPATIENT
Start: 2023-05-05 | End: 2023-05-07

## 2023-05-05 RX ORDER — VALSARTAN 160 MG/1
160 TABLET ORAL DAILY
Status: DISCONTINUED | OUTPATIENT
Start: 2023-05-05 | End: 2023-05-09

## 2023-05-05 RX ORDER — HEPARIN SODIUM 1000 [USP'U]/ML
4000 INJECTION, SOLUTION INTRAVENOUS; SUBCUTANEOUS PRN
Status: DISCONTINUED | OUTPATIENT
Start: 2023-05-05 | End: 2023-05-07

## 2023-05-05 RX ORDER — ATORVASTATIN CALCIUM 40 MG/1
80 TABLET, FILM COATED ORAL NIGHTLY
Status: DISCONTINUED | OUTPATIENT
Start: 2023-05-05 | End: 2023-05-10

## 2023-05-05 RX ORDER — POTASSIUM CHLORIDE 20 MEQ/1
40 TABLET, EXTENDED RELEASE ORAL ONCE
Status: COMPLETED | OUTPATIENT
Start: 2023-05-05 | End: 2023-05-05

## 2023-05-05 RX ADMIN — LORAZEPAM 0.25 MG: 0.5 TABLET ORAL at 07:54

## 2023-05-05 RX ADMIN — LORAZEPAM 0.25 MG: 0.5 TABLET ORAL at 14:25

## 2023-05-05 RX ADMIN — SODIUM CHLORIDE, PRESERVATIVE FREE 10 ML: 5 INJECTION INTRAVENOUS at 21:36

## 2023-05-05 RX ADMIN — SERTRALINE HYDROCHLORIDE 150 MG: 50 TABLET ORAL at 07:54

## 2023-05-05 RX ADMIN — METOPROLOL TARTRATE 25 MG: 25 TABLET, FILM COATED ORAL at 07:54

## 2023-05-05 RX ADMIN — HEPARIN SODIUM 19 UNITS/KG/HR: 10000 INJECTION, SOLUTION INTRAVENOUS at 17:29

## 2023-05-05 RX ADMIN — LORAZEPAM 0.25 MG: 0.5 TABLET ORAL at 21:36

## 2023-05-05 RX ADMIN — MIRTAZAPINE 30 MG: 15 TABLET, FILM COATED ORAL at 21:36

## 2023-05-05 RX ADMIN — ACETAMINOPHEN 650 MG: 325 TABLET ORAL at 14:25

## 2023-05-05 RX ADMIN — METOPROLOL TARTRATE 25 MG: 25 TABLET, FILM COATED ORAL at 21:35

## 2023-05-05 RX ADMIN — AMLODIPINE BESYLATE 10 MG: 10 TABLET ORAL at 07:54

## 2023-05-05 RX ADMIN — SODIUM CHLORIDE, PRESERVATIVE FREE 10 ML: 5 INJECTION INTRAVENOUS at 07:55

## 2023-05-05 RX ADMIN — TAMSULOSIN HYDROCHLORIDE 0.4 MG: 0.4 CAPSULE ORAL at 07:54

## 2023-05-05 RX ADMIN — VALSARTAN 160 MG: 160 TABLET, FILM COATED ORAL at 08:27

## 2023-05-05 RX ADMIN — HYDRALAZINE HYDROCHLORIDE 10 MG: 20 INJECTION, SOLUTION INTRAMUSCULAR; INTRAVENOUS at 06:05

## 2023-05-05 RX ADMIN — POTASSIUM CHLORIDE 40 MEQ: 1500 TABLET, EXTENDED RELEASE ORAL at 07:54

## 2023-05-05 RX ADMIN — ACETAMINOPHEN 650 MG: 325 TABLET ORAL at 22:56

## 2023-05-05 RX ADMIN — HEPARIN SODIUM 2000 UNITS: 1000 INJECTION INTRAVENOUS; SUBCUTANEOUS at 17:24

## 2023-05-05 RX ADMIN — ASPIRIN 81 MG CHEWABLE TABLET 81 MG: 81 TABLET CHEWABLE at 07:54

## 2023-05-05 RX ADMIN — FENOFIBRATE 54 MG: 54 TABLET, FILM COATED ORAL at 08:26

## 2023-05-05 ASSESSMENT — PAIN DESCRIPTION - LOCATION
LOCATION: MOUTH
LOCATION: HEAD

## 2023-05-05 ASSESSMENT — PAIN SCALES - GENERAL
PAINLEVEL_OUTOF10: 4
PAINLEVEL_OUTOF10: 4
PAINLEVEL_OUTOF10: 0
PAINLEVEL_OUTOF10: 1

## 2023-05-05 ASSESSMENT — PAIN - FUNCTIONAL ASSESSMENT
PAIN_FUNCTIONAL_ASSESSMENT: PREVENTS OR INTERFERES WITH MANY ACTIVE NOT PASSIVE ACTIVITIES
PAIN_FUNCTIONAL_ASSESSMENT: ACTIVITIES ARE NOT PREVENTED

## 2023-05-05 ASSESSMENT — PAIN DESCRIPTION - ORIENTATION: ORIENTATION: LEFT

## 2023-05-05 ASSESSMENT — PAIN DESCRIPTION - DESCRIPTORS
DESCRIPTORS: ACHING
DESCRIPTORS: ACHING

## 2023-05-06 LAB
ANION GAP SERPL CALCULATED.3IONS-SCNC: 14 MMOL/L (ref 4–16)
APTT: 64.4 SECONDS (ref 25.1–37.1)
BUN SERPL-MCNC: 20 MG/DL (ref 6–23)
CALCIUM SERPL-MCNC: 8.6 MG/DL (ref 8.3–10.6)
CHLORIDE BLD-SCNC: 100 MMOL/L (ref 99–110)
CO2: 21 MMOL/L (ref 21–32)
CREAT SERPL-MCNC: 1.7 MG/DL (ref 0.9–1.3)
GFR SERPL CREATININE-BSD FRML MDRD: 43 ML/MIN/1.73M2
GLUCOSE SERPL-MCNC: 115 MG/DL (ref 70–99)
HCT VFR BLD CALC: 42.4 % (ref 42–52)
HEMOGLOBIN: 13.5 GM/DL (ref 13.5–18)
MCH RBC QN AUTO: 29.2 PG (ref 27–31)
MCHC RBC AUTO-ENTMCNC: 31.8 % (ref 32–36)
MCV RBC AUTO: 91.8 FL (ref 78–100)
PDW BLD-RTO: 15.9 % (ref 11.7–14.9)
PLATELET # BLD: 230 K/CU MM (ref 140–440)
PMV BLD AUTO: 10 FL (ref 7.5–11.1)
POTASSIUM SERPL-SCNC: 3.8 MMOL/L (ref 3.5–5.1)
RBC # BLD: 4.62 M/CU MM (ref 4.6–6.2)
SODIUM BLD-SCNC: 135 MMOL/L (ref 135–145)
WBC # BLD: 8 K/CU MM (ref 4–10.5)

## 2023-05-06 PROCEDURE — 36415 COLL VENOUS BLD VENIPUNCTURE: CPT

## 2023-05-06 PROCEDURE — 94761 N-INVAS EAR/PLS OXIMETRY MLT: CPT

## 2023-05-06 PROCEDURE — 85027 COMPLETE CBC AUTOMATED: CPT

## 2023-05-06 PROCEDURE — 2140000000 HC CCU INTERMEDIATE R&B

## 2023-05-06 PROCEDURE — 6370000000 HC RX 637 (ALT 250 FOR IP): Performed by: STUDENT IN AN ORGANIZED HEALTH CARE EDUCATION/TRAINING PROGRAM

## 2023-05-06 PROCEDURE — 85730 THROMBOPLASTIN TIME PARTIAL: CPT

## 2023-05-06 PROCEDURE — 6370000000 HC RX 637 (ALT 250 FOR IP)

## 2023-05-06 PROCEDURE — 2580000003 HC RX 258

## 2023-05-06 PROCEDURE — 6370000000 HC RX 637 (ALT 250 FOR IP): Performed by: INTERNAL MEDICINE

## 2023-05-06 PROCEDURE — 80048 BASIC METABOLIC PNL TOTAL CA: CPT

## 2023-05-06 PROCEDURE — 99233 SBSQ HOSP IP/OBS HIGH 50: CPT | Performed by: INTERNAL MEDICINE

## 2023-05-06 RX ORDER — CLOPIDOGREL BISULFATE 75 MG/1
75 TABLET ORAL DAILY
Status: DISCONTINUED | OUTPATIENT
Start: 2023-05-06 | End: 2023-05-08

## 2023-05-06 RX ADMIN — ASPIRIN 81 MG CHEWABLE TABLET 81 MG: 81 TABLET CHEWABLE at 08:49

## 2023-05-06 RX ADMIN — TAMSULOSIN HYDROCHLORIDE 0.4 MG: 0.4 CAPSULE ORAL at 08:49

## 2023-05-06 RX ADMIN — VALSARTAN 160 MG: 160 TABLET, FILM COATED ORAL at 08:49

## 2023-05-06 RX ADMIN — METOPROLOL TARTRATE 25 MG: 25 TABLET, FILM COATED ORAL at 08:49

## 2023-05-06 RX ADMIN — AMLODIPINE BESYLATE 10 MG: 10 TABLET ORAL at 08:49

## 2023-05-06 RX ADMIN — SODIUM CHLORIDE, PRESERVATIVE FREE 10 ML: 5 INJECTION INTRAVENOUS at 21:21

## 2023-05-06 RX ADMIN — LORAZEPAM 0.25 MG: 0.5 TABLET ORAL at 17:33

## 2023-05-06 RX ADMIN — LORAZEPAM 0.25 MG: 0.5 TABLET ORAL at 21:21

## 2023-05-06 RX ADMIN — SERTRALINE HYDROCHLORIDE 150 MG: 50 TABLET ORAL at 08:49

## 2023-05-06 RX ADMIN — CLOPIDOGREL BISULFATE 75 MG: 75 TABLET ORAL at 11:28

## 2023-05-06 RX ADMIN — METOPROLOL TARTRATE 25 MG: 25 TABLET, FILM COATED ORAL at 21:21

## 2023-05-06 RX ADMIN — ACETAMINOPHEN 650 MG: 325 TABLET ORAL at 21:20

## 2023-05-06 RX ADMIN — ACETAMINOPHEN 650 MG: 325 TABLET ORAL at 08:49

## 2023-05-06 RX ADMIN — LORAZEPAM 0.25 MG: 0.5 TABLET ORAL at 11:28

## 2023-05-06 RX ADMIN — ATORVASTATIN CALCIUM 80 MG: 40 TABLET, FILM COATED ORAL at 21:20

## 2023-05-06 RX ADMIN — FENOFIBRATE 54 MG: 54 TABLET, FILM COATED ORAL at 08:49

## 2023-05-06 RX ADMIN — MIRTAZAPINE 30 MG: 15 TABLET, FILM COATED ORAL at 21:20

## 2023-05-06 RX ADMIN — LORAZEPAM 0.25 MG: 0.5 TABLET ORAL at 08:49

## 2023-05-06 ASSESSMENT — PAIN - FUNCTIONAL ASSESSMENT: PAIN_FUNCTIONAL_ASSESSMENT: ACTIVITIES ARE NOT PREVENTED

## 2023-05-06 ASSESSMENT — PAIN SCALES - GENERAL
PAINLEVEL_OUTOF10: 3
PAINLEVEL_OUTOF10: 2
PAINLEVEL_OUTOF10: 0
PAINLEVEL_OUTOF10: 0

## 2023-05-06 ASSESSMENT — PAIN DESCRIPTION - DESCRIPTORS
DESCRIPTORS: NAGGING;SHARP
DESCRIPTORS: ACHING

## 2023-05-06 ASSESSMENT — PAIN DESCRIPTION - ORIENTATION
ORIENTATION: LEFT
ORIENTATION: LEFT

## 2023-05-06 ASSESSMENT — PAIN DESCRIPTION - LOCATION
LOCATION: WRIST
LOCATION: WRIST

## 2023-05-06 ASSESSMENT — PAIN DESCRIPTION - PAIN TYPE: TYPE: CHRONIC PAIN

## 2023-05-07 LAB
ANION GAP SERPL CALCULATED.3IONS-SCNC: 13 MMOL/L (ref 4–16)
APTT: 34.1 SECONDS (ref 25.1–37.1)
BUN SERPL-MCNC: 27 MG/DL (ref 6–23)
CALCIUM SERPL-MCNC: 8.8 MG/DL (ref 8.3–10.6)
CHLORIDE BLD-SCNC: 102 MMOL/L (ref 99–110)
CO2: 21 MMOL/L (ref 21–32)
CREAT SERPL-MCNC: 2 MG/DL (ref 0.9–1.3)
GFR SERPL CREATININE-BSD FRML MDRD: 35 ML/MIN/1.73M2
GLUCOSE SERPL-MCNC: 102 MG/DL (ref 70–99)
HCT VFR BLD CALC: 41.8 % (ref 42–52)
HEMOGLOBIN: 13 GM/DL (ref 13.5–18)
MCH RBC QN AUTO: 29 PG (ref 27–31)
MCHC RBC AUTO-ENTMCNC: 31.1 % (ref 32–36)
MCV RBC AUTO: 93.3 FL (ref 78–100)
PDW BLD-RTO: 15.6 % (ref 11.7–14.9)
PLATELET # BLD: 221 K/CU MM (ref 140–440)
PMV BLD AUTO: 9.7 FL (ref 7.5–11.1)
POTASSIUM SERPL-SCNC: 3.8 MMOL/L (ref 3.5–5.1)
RBC # BLD: 4.48 M/CU MM (ref 4.6–6.2)
SODIUM BLD-SCNC: 136 MMOL/L (ref 135–145)
WBC # BLD: 8.3 K/CU MM (ref 4–10.5)

## 2023-05-07 PROCEDURE — 6370000000 HC RX 637 (ALT 250 FOR IP): Performed by: STUDENT IN AN ORGANIZED HEALTH CARE EDUCATION/TRAINING PROGRAM

## 2023-05-07 PROCEDURE — 85027 COMPLETE CBC AUTOMATED: CPT

## 2023-05-07 PROCEDURE — 85730 THROMBOPLASTIN TIME PARTIAL: CPT

## 2023-05-07 PROCEDURE — 2580000003 HC RX 258: Performed by: STUDENT IN AN ORGANIZED HEALTH CARE EDUCATION/TRAINING PROGRAM

## 2023-05-07 PROCEDURE — APPNB60 APP NON BILLABLE TIME 46-60 MINS: Performed by: NURSE PRACTITIONER

## 2023-05-07 PROCEDURE — 2580000003 HC RX 258

## 2023-05-07 PROCEDURE — 6370000000 HC RX 637 (ALT 250 FOR IP)

## 2023-05-07 PROCEDURE — 6360000002 HC RX W HCPCS

## 2023-05-07 PROCEDURE — 2140000000 HC CCU INTERMEDIATE R&B

## 2023-05-07 PROCEDURE — 80048 BASIC METABOLIC PNL TOTAL CA: CPT

## 2023-05-07 PROCEDURE — 94761 N-INVAS EAR/PLS OXIMETRY MLT: CPT

## 2023-05-07 PROCEDURE — 99233 SBSQ HOSP IP/OBS HIGH 50: CPT | Performed by: INTERNAL MEDICINE

## 2023-05-07 PROCEDURE — 36415 COLL VENOUS BLD VENIPUNCTURE: CPT

## 2023-05-07 PROCEDURE — 6360000004 HC RX CONTRAST MEDICATION

## 2023-05-07 PROCEDURE — 6370000000 HC RX 637 (ALT 250 FOR IP): Performed by: INTERNAL MEDICINE

## 2023-05-07 PROCEDURE — 2500000003 HC RX 250 WO HCPCS

## 2023-05-07 RX ORDER — SODIUM CHLORIDE 9 MG/ML
INJECTION, SOLUTION INTRAVENOUS CONTINUOUS
Status: DISCONTINUED | OUTPATIENT
Start: 2023-05-07 | End: 2023-05-10 | Stop reason: SDUPTHER

## 2023-05-07 RX ADMIN — MIRTAZAPINE 30 MG: 15 TABLET, FILM COATED ORAL at 21:30

## 2023-05-07 RX ADMIN — SODIUM CHLORIDE: 9 INJECTION, SOLUTION INTRAVENOUS at 13:23

## 2023-05-07 RX ADMIN — ACETAMINOPHEN 650 MG: 325 TABLET ORAL at 18:34

## 2023-05-07 RX ADMIN — METOPROLOL TARTRATE 25 MG: 25 TABLET, FILM COATED ORAL at 08:33

## 2023-05-07 RX ADMIN — ACETAMINOPHEN 650 MG: 325 TABLET ORAL at 08:33

## 2023-05-07 RX ADMIN — AMLODIPINE BESYLATE 10 MG: 10 TABLET ORAL at 08:33

## 2023-05-07 RX ADMIN — ASPIRIN 81 MG CHEWABLE TABLET 81 MG: 81 TABLET CHEWABLE at 08:32

## 2023-05-07 RX ADMIN — FENOFIBRATE 54 MG: 54 TABLET, FILM COATED ORAL at 08:33

## 2023-05-07 RX ADMIN — LORAZEPAM 0.25 MG: 0.5 TABLET ORAL at 17:58

## 2023-05-07 RX ADMIN — METOPROLOL TARTRATE 25 MG: 25 TABLET, FILM COATED ORAL at 21:30

## 2023-05-07 RX ADMIN — SERTRALINE HYDROCHLORIDE 150 MG: 50 TABLET ORAL at 08:32

## 2023-05-07 RX ADMIN — CLOPIDOGREL BISULFATE 75 MG: 75 TABLET ORAL at 08:32

## 2023-05-07 RX ADMIN — SODIUM CHLORIDE, PRESERVATIVE FREE 10 ML: 5 INJECTION INTRAVENOUS at 08:33

## 2023-05-07 RX ADMIN — VALSARTAN 160 MG: 160 TABLET, FILM COATED ORAL at 08:33

## 2023-05-07 RX ADMIN — ATORVASTATIN CALCIUM 80 MG: 40 TABLET, FILM COATED ORAL at 21:29

## 2023-05-07 RX ADMIN — LORAZEPAM 0.25 MG: 0.5 TABLET ORAL at 08:33

## 2023-05-07 RX ADMIN — TAMSULOSIN HYDROCHLORIDE 0.4 MG: 0.4 CAPSULE ORAL at 08:32

## 2023-05-07 RX ADMIN — LORAZEPAM 0.25 MG: 0.5 TABLET ORAL at 21:29

## 2023-05-07 RX ADMIN — LORAZEPAM 0.25 MG: 0.5 TABLET ORAL at 13:22

## 2023-05-07 ASSESSMENT — PAIN DESCRIPTION - ORIENTATION
ORIENTATION: LEFT
ORIENTATION: LEFT

## 2023-05-07 ASSESSMENT — PAIN SCALES - GENERAL
PAINLEVEL_OUTOF10: 5
PAINLEVEL_OUTOF10: 5

## 2023-05-07 ASSESSMENT — PAIN DESCRIPTION - ONSET: ONSET: ON-GOING

## 2023-05-07 ASSESSMENT — PAIN DESCRIPTION - FREQUENCY: FREQUENCY: CONTINUOUS

## 2023-05-07 ASSESSMENT — PAIN DESCRIPTION - PAIN TYPE: TYPE: CHRONIC PAIN

## 2023-05-07 ASSESSMENT — PAIN DESCRIPTION - DESCRIPTORS
DESCRIPTORS: ACHING
DESCRIPTORS: ACHING

## 2023-05-07 ASSESSMENT — PAIN DESCRIPTION - LOCATION
LOCATION: WRIST
LOCATION: WRIST

## 2023-05-07 ASSESSMENT — PAIN - FUNCTIONAL ASSESSMENT: PAIN_FUNCTIONAL_ASSESSMENT: PREVENTS OR INTERFERES SOME ACTIVE ACTIVITIES AND ADLS

## 2023-05-08 LAB
ANION GAP SERPL CALCULATED.3IONS-SCNC: 13 MMOL/L (ref 4–16)
BASE EXCESS: 2 (ref 0–3.3)
BUN SERPL-MCNC: 26 MG/DL (ref 6–23)
CALCIUM SERPL-MCNC: 8.8 MG/DL (ref 8.3–10.6)
CARBON MONOXIDE, BLOOD: 2.1 % (ref 0–5)
CHLORIDE BLD-SCNC: 102 MMOL/L (ref 99–110)
CO2 CONTENT: 24 MMOL/L (ref 19–24)
CO2: 22 MMOL/L (ref 21–32)
COMMENT: ABNORMAL
CREAT SERPL-MCNC: 1.8 MG/DL (ref 0.9–1.3)
ESTIMATED AVERAGE GLUCOSE: 117 MG/DL
GFR SERPL CREATININE-BSD FRML MDRD: 40 ML/MIN/1.73M2
GLUCOSE SERPL-MCNC: 105 MG/DL (ref 70–99)
HBA1C MFR BLD: 5.7 % (ref 4.2–6.3)
HCO3 ARTERIAL: 22.9 MMOL/L (ref 18–23)
METHEMOGLOBIN ARTERIAL: 2 %
O2 SATURATION: 92.6 % (ref 96–97)
PCO2 ARTERIAL: 37 MMHG (ref 32–45)
PH BLOOD: 7.4 (ref 7.34–7.45)
PO2 ARTERIAL: 80 MMHG (ref 75–100)
POTASSIUM SERPL-SCNC: 3.9 MMOL/L (ref 3.5–5.1)
SODIUM BLD-SCNC: 137 MMOL/L (ref 135–145)

## 2023-05-08 PROCEDURE — 37799 UNLISTED PX VASCULAR SURGERY: CPT

## 2023-05-08 PROCEDURE — 2580000003 HC RX 258

## 2023-05-08 PROCEDURE — 2500000003 HC RX 250 WO HCPCS

## 2023-05-08 PROCEDURE — 6370000000 HC RX 637 (ALT 250 FOR IP): Performed by: INTERNAL MEDICINE

## 2023-05-08 PROCEDURE — C1894 INTRO/SHEATH, NON-LASER: HCPCS

## 2023-05-08 PROCEDURE — 6360000004 HC RX CONTRAST MEDICATION

## 2023-05-08 PROCEDURE — 93458 L HRT ARTERY/VENTRICLE ANGIO: CPT | Performed by: INTERNAL MEDICINE

## 2023-05-08 PROCEDURE — 80048 BASIC METABOLIC PNL TOTAL CA: CPT

## 2023-05-08 PROCEDURE — 6360000002 HC RX W HCPCS

## 2023-05-08 PROCEDURE — 94761 N-INVAS EAR/PLS OXIMETRY MLT: CPT

## 2023-05-08 PROCEDURE — B2151ZZ FLUOROSCOPY OF LEFT HEART USING LOW OSMOLAR CONTRAST: ICD-10-PCS | Performed by: INTERNAL MEDICINE

## 2023-05-08 PROCEDURE — 4A023N7 MEASUREMENT OF CARDIAC SAMPLING AND PRESSURE, LEFT HEART, PERCUTANEOUS APPROACH: ICD-10-PCS | Performed by: INTERNAL MEDICINE

## 2023-05-08 PROCEDURE — 2709999900 HC NON-CHARGEABLE SUPPLY

## 2023-05-08 PROCEDURE — B2111ZZ FLUOROSCOPY OF MULTIPLE CORONARY ARTERIES USING LOW OSMOLAR CONTRAST: ICD-10-PCS | Performed by: INTERNAL MEDICINE

## 2023-05-08 PROCEDURE — 36415 COLL VENOUS BLD VENIPUNCTURE: CPT

## 2023-05-08 PROCEDURE — 2140000000 HC CCU INTERMEDIATE R&B

## 2023-05-08 PROCEDURE — 93458 L HRT ARTERY/VENTRICLE ANGIO: CPT

## 2023-05-08 PROCEDURE — 83036 HEMOGLOBIN GLYCOSYLATED A1C: CPT

## 2023-05-08 PROCEDURE — 6370000000 HC RX 637 (ALT 250 FOR IP)

## 2023-05-08 PROCEDURE — C1769 GUIDE WIRE: HCPCS

## 2023-05-08 PROCEDURE — 82803 BLOOD GASES ANY COMBINATION: CPT

## 2023-05-08 PROCEDURE — 2580000003 HC RX 258: Performed by: INTERNAL MEDICINE

## 2023-05-08 PROCEDURE — 6370000000 HC RX 637 (ALT 250 FOR IP): Performed by: STUDENT IN AN ORGANIZED HEALTH CARE EDUCATION/TRAINING PROGRAM

## 2023-05-08 RX ADMIN — SODIUM CHLORIDE, PRESERVATIVE FREE 10 ML: 5 INJECTION INTRAVENOUS at 21:28

## 2023-05-08 RX ADMIN — SODIUM CHLORIDE, PRESERVATIVE FREE 10 ML: 5 INJECTION INTRAVENOUS at 09:42

## 2023-05-08 RX ADMIN — ASPIRIN 81 MG CHEWABLE TABLET 81 MG: 81 TABLET CHEWABLE at 09:41

## 2023-05-08 RX ADMIN — TAMSULOSIN HYDROCHLORIDE 0.4 MG: 0.4 CAPSULE ORAL at 09:41

## 2023-05-08 RX ADMIN — SERTRALINE HYDROCHLORIDE 150 MG: 50 TABLET ORAL at 09:41

## 2023-05-08 RX ADMIN — FENOFIBRATE 54 MG: 54 TABLET, FILM COATED ORAL at 09:53

## 2023-05-08 RX ADMIN — ACETAMINOPHEN 650 MG: 325 TABLET ORAL at 09:41

## 2023-05-08 RX ADMIN — AMLODIPINE BESYLATE 10 MG: 10 TABLET ORAL at 09:41

## 2023-05-08 RX ADMIN — MIRTAZAPINE 30 MG: 15 TABLET, FILM COATED ORAL at 21:17

## 2023-05-08 RX ADMIN — METOPROLOL TARTRATE 25 MG: 25 TABLET, FILM COATED ORAL at 21:17

## 2023-05-08 RX ADMIN — METOPROLOL TARTRATE 25 MG: 25 TABLET, FILM COATED ORAL at 09:41

## 2023-05-08 RX ADMIN — LORAZEPAM 0.25 MG: 0.5 TABLET ORAL at 13:18

## 2023-05-08 RX ADMIN — LORAZEPAM 0.25 MG: 0.5 TABLET ORAL at 09:41

## 2023-05-08 RX ADMIN — LORAZEPAM 0.25 MG: 0.5 TABLET ORAL at 21:17

## 2023-05-08 RX ADMIN — ACETAMINOPHEN 650 MG: 325 TABLET ORAL at 16:19

## 2023-05-08 RX ADMIN — ACETAMINOPHEN 650 MG: 325 TABLET ORAL at 21:20

## 2023-05-08 RX ADMIN — LORAZEPAM 0.25 MG: 0.5 TABLET ORAL at 16:20

## 2023-05-08 RX ADMIN — ATORVASTATIN CALCIUM 80 MG: 40 TABLET, FILM COATED ORAL at 21:17

## 2023-05-08 RX ADMIN — CLOPIDOGREL BISULFATE 75 MG: 75 TABLET ORAL at 09:41

## 2023-05-08 ASSESSMENT — PAIN DESCRIPTION - DESCRIPTORS: DESCRIPTORS: ACHING

## 2023-05-08 ASSESSMENT — PAIN - FUNCTIONAL ASSESSMENT: PAIN_FUNCTIONAL_ASSESSMENT: PREVENTS OR INTERFERES SOME ACTIVE ACTIVITIES AND ADLS

## 2023-05-08 ASSESSMENT — PAIN DESCRIPTION - ORIENTATION
ORIENTATION: LEFT
ORIENTATION: LEFT

## 2023-05-08 ASSESSMENT — PAIN SCALES - GENERAL
PAINLEVEL_OUTOF10: 0
PAINLEVEL_OUTOF10: 3
PAINLEVEL_OUTOF10: 5

## 2023-05-08 ASSESSMENT — PAIN DESCRIPTION - LOCATION
LOCATION: WRIST
LOCATION: WRIST

## 2023-05-08 ASSESSMENT — PAIN DESCRIPTION - PAIN TYPE: TYPE: CHRONIC PAIN

## 2023-05-08 NOTE — CONSULTS
Chloride 102 99 - 110 mMol/L    CO2 22 21 - 32 MMOL/L    Anion Gap 13 4 - 16    BUN 26 (H) 6 - 23 MG/DL    Creatinine 1.8 (H) 0.9 - 1.3 MG/DL    Est, Glom Filt Rate 40 (L) >60 mL/min/1.73m2    Glucose 105 (H) 70 - 99 MG/DL    Calcium 8.8 8.3 - 10.6 MG/DL   Blood gas, arterial    Collection Time: 05/08/23  2:45 PM   Result Value Ref Range    pH, Bld 7.40 7.34 - 7.45    pCO2, Arterial 37.0 32 - 45 MMHG    pO2, Arterial 80 75 - 100 MMHG    Base Excess 2 0 - 3.3    HCO3, Arterial 22.9 18 - 23 MMOL/L    CO2 Content 24.0 19 - 24 MMOL/L    O2 Sat 92.6 (L) 96 - 97 %    Carbon Monoxide, Blood 2.1 0 - 5 %    Methemoglobin, Arterial 2.0 (H) <1.5 %    Comment CATH LAB          Left Cardiac Cath:  LMCA: Normal.  LAD: Abnormal.70% stenosis at D3 , bifurcation lesion  Wrap around LAD  D1 is subtotalled and smaller vessel  LCx: Abnormal.mid LCX 99% stenosis prior to giving off two large OM  OM1 70% and OM2 90% stenosis  RCA: Abnormal.70 % stenosis in mid RCA  90% stenosis distal RCA bifurcates into Rt PL and Rt PDA       Echocardiogram:  Technically difficult examination due to patient supine   Left ventricular systolic function is normal.   Ejection fraction is visually estimated at 55%. Mild left ventricular hypertrophy. Grade I diastolic dysfunction. No significant valvular disease noted. No evidence of any pericardial effusion    Hx of:  Afib: no  PCI: no  Chest radiation: no    STS scoring:  Risk of Mortality:  3.953%  Renal Failure:  3.840%  Permanent Stroke:  3.359%  Prolonged Ventilation:  12.691%  DSW Infection:  0.145%  Reoperation:  2.696%  Morbidity or Mortality:  20.352%  Short Length of Stay:  30.073%  Long Length of Stay:  9.453%          Assessment :      NSTEMI 2/2 severe multivessel CAD  Consider CABG vs PCI.        Plan:     Continue ASA and plavix for now  Discuss at structural heart meeting Wednesday morning 5/10/23  Further recommendations per Dr. Avery Arellano PA-C 05/08/23 3:33

## 2023-05-08 NOTE — PLAN OF CARE
Problem: Discharge Planning  Goal: Discharge to home or other facility with appropriate resources  5/7/2023 2101 by Dick Cote LPN  Outcome: Progressing  5/7/2023 0949 by Chilango Bowman RN  Outcome: Progressing     Problem: Safety - Adult  Goal: Free from fall injury  5/7/2023 2101 by Dick Cote LPN  Outcome: Progressing  5/7/2023 0949 by Chilango Bowman RN  Outcome: Progressing     Problem: Chronic Conditions and Co-morbidities  Goal: Patient's chronic conditions and co-morbidity symptoms are monitored and maintained or improved  5/7/2023 2101 by Dick Cote LPN  Outcome: Progressing  5/7/2023 0949 by Chilango Bowman RN  Outcome: Progressing     Problem: Skin/Tissue Integrity  Goal: Absence of new skin breakdown  Description: 1. Monitor for areas of redness and/or skin breakdown  2. Assess vascular access sites hourly  3. Every 4-6 hours minimum:  Change oxygen saturation probe site  4. Every 4-6 hours:  If on nasal continuous positive airway pressure, respiratory therapy assess nares and determine need for appliance change or resting period.   5/7/2023 2101 by Dick Cote LPN  Outcome: Progressing  5/7/2023 0949 by Chilango Bowman RN  Outcome: Progressing     Problem: Pain  Goal: Verbalizes/displays adequate comfort level or baseline comfort level  5/7/2023 2101 by Dick Cote LPN  Outcome: Progressing  5/7/2023 0949 by Chilango Bowman RN  Outcome: Progressing     Problem: Nutrition Deficit:  Goal: Optimize nutritional status  5/7/2023 2101 by Dick Cote LPN  Outcome: Progressing  5/7/2023 0949 by Chilango Bowman RN  Outcome: Progressing

## 2023-05-09 LAB
ANION GAP SERPL CALCULATED.3IONS-SCNC: 12 MMOL/L (ref 4–16)
BUN SERPL-MCNC: 26 MG/DL (ref 6–23)
CALCIUM SERPL-MCNC: 8.5 MG/DL (ref 8.3–10.6)
CHLORIDE BLD-SCNC: 102 MMOL/L (ref 99–110)
CO2: 23 MMOL/L (ref 21–32)
CREAT SERPL-MCNC: 1.8 MG/DL (ref 0.9–1.3)
GFR SERPL CREATININE-BSD FRML MDRD: 40 ML/MIN/1.73M2
GLUCOSE SERPL-MCNC: 99 MG/DL (ref 70–99)
POTASSIUM SERPL-SCNC: 4 MMOL/L (ref 3.5–5.1)
SODIUM BLD-SCNC: 137 MMOL/L (ref 135–145)

## 2023-05-09 PROCEDURE — 36415 COLL VENOUS BLD VENIPUNCTURE: CPT

## 2023-05-09 PROCEDURE — 99233 SBSQ HOSP IP/OBS HIGH 50: CPT | Performed by: INTERNAL MEDICINE

## 2023-05-09 PROCEDURE — 80048 BASIC METABOLIC PNL TOTAL CA: CPT

## 2023-05-09 PROCEDURE — 6370000000 HC RX 637 (ALT 250 FOR IP): Performed by: INTERNAL MEDICINE

## 2023-05-09 PROCEDURE — 99223 1ST HOSP IP/OBS HIGH 75: CPT | Performed by: THORACIC SURGERY (CARDIOTHORACIC VASCULAR SURGERY)

## 2023-05-09 PROCEDURE — 6370000000 HC RX 637 (ALT 250 FOR IP)

## 2023-05-09 PROCEDURE — 94761 N-INVAS EAR/PLS OXIMETRY MLT: CPT

## 2023-05-09 PROCEDURE — 6370000000 HC RX 637 (ALT 250 FOR IP): Performed by: STUDENT IN AN ORGANIZED HEALTH CARE EDUCATION/TRAINING PROGRAM

## 2023-05-09 PROCEDURE — 2140000000 HC CCU INTERMEDIATE R&B

## 2023-05-09 RX ORDER — VALSARTAN 160 MG/1
160 TABLET ORAL 2 TIMES DAILY
Status: DISCONTINUED | OUTPATIENT
Start: 2023-05-09 | End: 2023-05-13 | Stop reason: HOSPADM

## 2023-05-09 RX ORDER — CLOPIDOGREL BISULFATE 75 MG/1
75 TABLET ORAL DAILY
Status: DISCONTINUED | OUTPATIENT
Start: 2023-05-09 | End: 2023-05-10 | Stop reason: SDUPTHER

## 2023-05-09 RX ORDER — GABAPENTIN 300 MG/1
300 CAPSULE ORAL 3 TIMES DAILY
Status: DISCONTINUED | OUTPATIENT
Start: 2023-05-09 | End: 2023-05-13 | Stop reason: HOSPADM

## 2023-05-09 RX ADMIN — LORAZEPAM 0.25 MG: 0.5 TABLET ORAL at 17:53

## 2023-05-09 RX ADMIN — ATORVASTATIN CALCIUM 80 MG: 40 TABLET, FILM COATED ORAL at 20:13

## 2023-05-09 RX ADMIN — TAMSULOSIN HYDROCHLORIDE 0.4 MG: 0.4 CAPSULE ORAL at 09:05

## 2023-05-09 RX ADMIN — VALSARTAN 160 MG: 160 TABLET, FILM COATED ORAL at 09:05

## 2023-05-09 RX ADMIN — LORAZEPAM 0.25 MG: 0.5 TABLET ORAL at 14:06

## 2023-05-09 RX ADMIN — METOPROLOL TARTRATE 25 MG: 25 TABLET, FILM COATED ORAL at 20:13

## 2023-05-09 RX ADMIN — METOPROLOL TARTRATE 25 MG: 25 TABLET, FILM COATED ORAL at 09:05

## 2023-05-09 RX ADMIN — GABAPENTIN 300 MG: 300 CAPSULE ORAL at 20:13

## 2023-05-09 RX ADMIN — CLOPIDOGREL BISULFATE 75 MG: 75 TABLET ORAL at 14:06

## 2023-05-09 RX ADMIN — SERTRALINE HYDROCHLORIDE 150 MG: 50 TABLET ORAL at 09:05

## 2023-05-09 RX ADMIN — ACETAMINOPHEN 650 MG: 325 TABLET ORAL at 09:08

## 2023-05-09 RX ADMIN — VALSARTAN 160 MG: 160 TABLET, FILM COATED ORAL at 20:13

## 2023-05-09 RX ADMIN — ASPIRIN 81 MG CHEWABLE TABLET 81 MG: 81 TABLET CHEWABLE at 09:05

## 2023-05-09 RX ADMIN — MIRTAZAPINE 30 MG: 15 TABLET, FILM COATED ORAL at 20:13

## 2023-05-09 RX ADMIN — ACETAMINOPHEN 650 MG: 325 TABLET ORAL at 17:53

## 2023-05-09 RX ADMIN — LORAZEPAM 0.25 MG: 0.5 TABLET ORAL at 09:09

## 2023-05-09 RX ADMIN — LORAZEPAM 0.25 MG: 0.5 TABLET ORAL at 20:12

## 2023-05-09 RX ADMIN — FENOFIBRATE 54 MG: 54 TABLET, FILM COATED ORAL at 09:14

## 2023-05-09 RX ADMIN — AMLODIPINE BESYLATE 10 MG: 10 TABLET ORAL at 09:05

## 2023-05-09 ASSESSMENT — PAIN DESCRIPTION - ORIENTATION
ORIENTATION: LEFT

## 2023-05-09 ASSESSMENT — PAIN SCALES - GENERAL
PAINLEVEL_OUTOF10: 5
PAINLEVEL_OUTOF10: 0
PAINLEVEL_OUTOF10: 3
PAINLEVEL_OUTOF10: 0

## 2023-05-09 ASSESSMENT — PAIN DESCRIPTION - LOCATION
LOCATION: WRIST
LOCATION: ARM
LOCATION: ARM

## 2023-05-09 NOTE — PLAN OF CARE
Problem: Discharge Planning  Goal: Discharge to home or other facility with appropriate resources  Outcome: Progressing  Flowsheets (Taken 5/8/2023 2000)  Discharge to home or other facility with appropriate resources:   Identify barriers to discharge with patient and caregiver   Arrange for needed discharge resources and transportation as appropriate   Identify discharge learning needs (meds, wound care, etc)   Arrange for interpreters to assist at discharge as needed   Refer to discharge planning if patient needs post-hospital services based on physician order or complex needs related to functional status, cognitive ability or social support system     Problem: Safety - Adult  Goal: Free from fall injury  Outcome: Progressing     Problem: Chronic Conditions and Co-morbidities  Goal: Patient's chronic conditions and co-morbidity symptoms are monitored and maintained or improved  Outcome: Progressing  Flowsheets (Taken 5/8/2023 2000)  Care Plan - Patient's Chronic Conditions and Co-Morbidity Symptoms are Monitored and Maintained or Improved:   Monitor and assess patient's chronic conditions and comorbid symptoms for stability, deterioration, or improvement   Collaborate with multidisciplinary team to address chronic and comorbid conditions and prevent exacerbation or deterioration   Update acute care plan with appropriate goals if chronic or comorbid symptoms are exacerbated and prevent overall improvement and discharge     Problem: Skin/Tissue Integrity  Goal: Absence of new skin breakdown  Description: 1. Monitor for areas of redness and/or skin breakdown  2. Assess vascular access sites hourly  3. Every 4-6 hours minimum:  Change oxygen saturation probe site  4. Every 4-6 hours:  If on nasal continuous positive airway pressure, respiratory therapy assess nares and determine need for appliance change or resting period.   Outcome: Progressing     Problem: Pain  Goal: Verbalizes/displays adequate comfort level

## 2023-05-10 LAB
ALBUMIN SERPL ELPH-MCNC: 3 GM/DL (ref 3.2–5.6)
ALBUMIN, U: 68 %
ALPHA-1-GLOBULIN, U: 6 %
ALPHA-1-GLOBULIN: 0.2 GM/DL (ref 0.1–0.4)
ALPHA-2-GLOBULIN, U: 8 %
ALPHA-2-GLOBULIN: 1 GM/DL (ref 0.4–1.2)
ANION GAP SERPL CALCULATED.3IONS-SCNC: 12 MMOL/L (ref 4–16)
BETA GLOBULIN, U: 10 %
BETA GLOBULIN: 0.8 GM/DL (ref 0.5–1.3)
BUN SERPL-MCNC: 28 MG/DL (ref 6–23)
CALCIUM SERPL-MCNC: 8.9 MG/DL (ref 8.3–10.6)
CHLORIDE BLD-SCNC: 100 MMOL/L (ref 99–110)
CO2: 23 MMOL/L (ref 21–32)
CREAT SERPL-MCNC: 1.9 MG/DL (ref 0.9–1.3)
GAMMA GLOBULIN, U: 7 %
GAMMA GLOBULIN: 0.7 GM/DL (ref 0.5–1.6)
GFR SERPL CREATININE-BSD FRML MDRD: 37 ML/MIN/1.73M2
GLUCOSE SERPL-MCNC: 94 MG/DL (ref 70–99)
POTASSIUM SERPL-SCNC: 4.4 MMOL/L (ref 3.5–5.1)
PROTEIN ELP 24 HOUR URINE: 6600 MG/24 HR
SODIUM BLD-SCNC: 135 MMOL/L (ref 135–145)
SPEP INTERPRETATION: ABNORMAL
SPEP INTERPRETATION: ABNORMAL
TOTAL PROTEIN: 5.7 GM/DL (ref 6.4–8.2)

## 2023-05-10 PROCEDURE — 6370000000 HC RX 637 (ALT 250 FOR IP): Performed by: STUDENT IN AN ORGANIZED HEALTH CARE EDUCATION/TRAINING PROGRAM

## 2023-05-10 PROCEDURE — C1887 CATHETER, GUIDING: HCPCS

## 2023-05-10 PROCEDURE — 6370000000 HC RX 637 (ALT 250 FOR IP)

## 2023-05-10 PROCEDURE — 6370000000 HC RX 637 (ALT 250 FOR IP): Performed by: INTERNAL MEDICINE

## 2023-05-10 PROCEDURE — 2580000003 HC RX 258: Performed by: INTERNAL MEDICINE

## 2023-05-10 PROCEDURE — 1200000000 HC SEMI PRIVATE

## 2023-05-10 PROCEDURE — 6360000002 HC RX W HCPCS

## 2023-05-10 PROCEDURE — 93005 ELECTROCARDIOGRAM TRACING: CPT | Performed by: INTERNAL MEDICINE

## 2023-05-10 PROCEDURE — C1894 INTRO/SHEATH, NON-LASER: HCPCS

## 2023-05-10 PROCEDURE — 2140000000 HC CCU INTERMEDIATE R&B

## 2023-05-10 PROCEDURE — 02F03ZZ FRAGMENTATION IN CORONARY ARTERY, ONE ARTERY, PERCUTANEOUS APPROACH: ICD-10-PCS | Performed by: INTERNAL MEDICINE

## 2023-05-10 PROCEDURE — C1725 CATH, TRANSLUMIN NON-LASER: HCPCS

## 2023-05-10 PROCEDURE — 2580000003 HC RX 258

## 2023-05-10 PROCEDURE — APPSS30 APP SPLIT SHARED TIME 16-30 MINUTES

## 2023-05-10 PROCEDURE — C9600 PERC DRUG-EL COR STENT SING: HCPCS

## 2023-05-10 PROCEDURE — C9601 PERC DRUG-EL COR STENT BRAN: HCPCS

## 2023-05-10 PROCEDURE — 2709999900 HC NON-CHARGEABLE SUPPLY

## 2023-05-10 PROCEDURE — 6360000004 HC RX CONTRAST MEDICATION

## 2023-05-10 PROCEDURE — 36415 COLL VENOUS BLD VENIPUNCTURE: CPT

## 2023-05-10 PROCEDURE — 027035Z DILATION OF CORONARY ARTERY, ONE ARTERY WITH TWO DRUG-ELUTING INTRALUMINAL DEVICES, PERCUTANEOUS APPROACH: ICD-10-PCS | Performed by: INTERNAL MEDICINE

## 2023-05-10 PROCEDURE — 80048 BASIC METABOLIC PNL TOTAL CA: CPT

## 2023-05-10 PROCEDURE — 2500000003 HC RX 250 WO HCPCS

## 2023-05-10 PROCEDURE — 0715T HC PERQ TRLUML CORONRY LITHOTRP: CPT

## 2023-05-10 PROCEDURE — C1874 STENT, COATED/COV W/DEL SYS: HCPCS

## 2023-05-10 PROCEDURE — 94761 N-INVAS EAR/PLS OXIMETRY MLT: CPT

## 2023-05-10 PROCEDURE — C1769 GUIDE WIRE: HCPCS

## 2023-05-10 PROCEDURE — C1761 HC CATH TRANSLUM INTRAVASCULAR LITHOTRIPSY CORONARY: HCPCS

## 2023-05-10 PROCEDURE — 92928 PRQ TCAT PLMT NTRAC ST 1 LES: CPT | Performed by: INTERNAL MEDICINE

## 2023-05-10 PROCEDURE — 99233 SBSQ HOSP IP/OBS HIGH 50: CPT | Performed by: INTERNAL MEDICINE

## 2023-05-10 RX ORDER — CLOPIDOGREL BISULFATE 75 MG/1
75 TABLET ORAL DAILY
Status: DISCONTINUED | OUTPATIENT
Start: 2023-05-11 | End: 2023-05-13 | Stop reason: HOSPADM

## 2023-05-10 RX ORDER — SODIUM CHLORIDE 0.9 % (FLUSH) 0.9 %
5-40 SYRINGE (ML) INJECTION EVERY 12 HOURS SCHEDULED
Status: DISCONTINUED | OUTPATIENT
Start: 2023-05-10 | End: 2023-05-13 | Stop reason: HOSPADM

## 2023-05-10 RX ORDER — SODIUM CHLORIDE 9 MG/ML
INJECTION, SOLUTION INTRAVENOUS PRN
Status: DISCONTINUED | OUTPATIENT
Start: 2023-05-10 | End: 2023-05-13 | Stop reason: HOSPADM

## 2023-05-10 RX ORDER — SODIUM CHLORIDE 9 MG/ML
INJECTION, SOLUTION INTRAVENOUS CONTINUOUS
Status: DISPENSED | OUTPATIENT
Start: 2023-05-10 | End: 2023-05-11

## 2023-05-10 RX ORDER — ACETAMINOPHEN 325 MG/1
650 TABLET ORAL EVERY 4 HOURS PRN
Status: DISCONTINUED | OUTPATIENT
Start: 2023-05-10 | End: 2023-05-13 | Stop reason: HOSPADM

## 2023-05-10 RX ORDER — SODIUM CHLORIDE 0.9 % (FLUSH) 0.9 %
5-40 SYRINGE (ML) INJECTION PRN
Status: DISCONTINUED | OUTPATIENT
Start: 2023-05-10 | End: 2023-05-13 | Stop reason: HOSPADM

## 2023-05-10 RX ORDER — ATORVASTATIN CALCIUM 40 MG/1
80 TABLET, FILM COATED ORAL NIGHTLY
Status: DISCONTINUED | OUTPATIENT
Start: 2023-05-10 | End: 2023-05-13 | Stop reason: HOSPADM

## 2023-05-10 RX ORDER — ASPIRIN 81 MG/1
81 TABLET, CHEWABLE ORAL DAILY
Status: DISCONTINUED | OUTPATIENT
Start: 2023-05-11 | End: 2023-05-13 | Stop reason: HOSPADM

## 2023-05-10 RX ADMIN — CLOPIDOGREL BISULFATE 75 MG: 75 TABLET ORAL at 08:56

## 2023-05-10 RX ADMIN — LORAZEPAM 0.25 MG: 0.5 TABLET ORAL at 21:34

## 2023-05-10 RX ADMIN — GABAPENTIN 300 MG: 300 CAPSULE ORAL at 21:34

## 2023-05-10 RX ADMIN — SODIUM CHLORIDE, PRESERVATIVE FREE 10 ML: 5 INJECTION INTRAVENOUS at 21:38

## 2023-05-10 RX ADMIN — AMLODIPINE BESYLATE 10 MG: 10 TABLET ORAL at 08:56

## 2023-05-10 RX ADMIN — MIRTAZAPINE 30 MG: 15 TABLET, FILM COATED ORAL at 21:34

## 2023-05-10 RX ADMIN — GABAPENTIN 300 MG: 300 CAPSULE ORAL at 17:29

## 2023-05-10 RX ADMIN — ACETAMINOPHEN 650 MG: 325 TABLET ORAL at 17:29

## 2023-05-10 RX ADMIN — SODIUM CHLORIDE, PRESERVATIVE FREE 10 ML: 5 INJECTION INTRAVENOUS at 21:37

## 2023-05-10 RX ADMIN — SODIUM CHLORIDE: 9 INJECTION, SOLUTION INTRAVENOUS at 17:00

## 2023-05-10 RX ADMIN — TAMSULOSIN HYDROCHLORIDE 0.4 MG: 0.4 CAPSULE ORAL at 08:56

## 2023-05-10 RX ADMIN — VALSARTAN 160 MG: 160 TABLET, FILM COATED ORAL at 08:56

## 2023-05-10 RX ADMIN — METOPROLOL TARTRATE 25 MG: 25 TABLET, FILM COATED ORAL at 09:01

## 2023-05-10 RX ADMIN — FENOFIBRATE 54 MG: 54 TABLET, FILM COATED ORAL at 08:56

## 2023-05-10 RX ADMIN — METOPROLOL TARTRATE 25 MG: 25 TABLET, FILM COATED ORAL at 21:34

## 2023-05-10 RX ADMIN — GABAPENTIN 300 MG: 300 CAPSULE ORAL at 08:56

## 2023-05-10 RX ADMIN — ASPIRIN 81 MG CHEWABLE TABLET 81 MG: 81 TABLET CHEWABLE at 08:56

## 2023-05-10 RX ADMIN — LORAZEPAM 0.25 MG: 0.5 TABLET ORAL at 17:29

## 2023-05-10 RX ADMIN — ATORVASTATIN CALCIUM 80 MG: 40 TABLET, FILM COATED ORAL at 21:34

## 2023-05-10 RX ADMIN — SERTRALINE HYDROCHLORIDE 150 MG: 50 TABLET ORAL at 08:56

## 2023-05-10 RX ADMIN — LORAZEPAM 0.25 MG: 0.5 TABLET ORAL at 08:56

## 2023-05-10 ASSESSMENT — PAIN DESCRIPTION - ORIENTATION: ORIENTATION: LEFT

## 2023-05-10 ASSESSMENT — PAIN SCALES - GENERAL: PAINLEVEL_OUTOF10: 5

## 2023-05-10 ASSESSMENT — PAIN DESCRIPTION - LOCATION: LOCATION: ARM

## 2023-05-10 ASSESSMENT — PAIN DESCRIPTION - DESCRIPTORS: DESCRIPTORS: ACHING;DISCOMFORT

## 2023-05-10 NOTE — PROCEDURES
Indication: 79-year-old male with NSTEMI was here for staged PCI. Patient had left heart cath 2 days ago and a heart approach was adopted. He was deemed too high risk for coronary bypass surgery. He is here for left circumflex artery intervention. Sedation: Versed and fentanyl    Estimated blood loss: 5 cc    ASA: 3    Mallampati score: 2    Access: Right radial artery using modified Seldinger technique. Left coronary artery was engaged using a 6 [place] XB 3.0 guide catheter:      Findings:  Coronary angiography showed severe calcified disease in the mid left request artery and a bifurcating fashion with a large OM1. OM1 and left circumflex had similar sizes. LAD had a focal 90% lesion in its mid segment where it gives off small to medium size diagonal artery. Coronary intervention details: Left circumflex artery was wired using a run-through and was provided. OM 1 artery was challenging to wire because of right ankle. After wiring the balloon dilated using a 1.5 followed by 2.5 balloons. Afterwards we could not leave any stent so we performed shockwave lithotripsy. Subsequently 2.75 stent was deployed in left circumflex going into OM1. Two 2.25 mm overlapping drug-eluting stents were deployed in left circumflex artery. This was performed in a T and protrusion fashion with OM1 and left circumflex as main vessel and distal left circumflex has sidebranch. Subsequently proximal left circumflex was postdilated with a 3 oh balloon. Final kissing balloon fashion was performed using a 2.5 and 3 balloon in left circumflex and OM1 respectively. Excellent angiographic result was obtained      Arteriotomy closure: TR band    Complications: None    Conclusion:    2.5 mm resolute [place] stent to left circumflex into OM1 post dilated with 3 mm NC balloon. 2.255 mm overlapping stents in left circumflex artery post dilated with 2.5 mm NC balloon.         Recommendations:      Continue with aspirin and

## 2023-05-10 NOTE — PLAN OF CARE
Problem: Discharge Planning  Goal: Discharge to home or other facility with appropriate resources  Outcome: Progressing  Flowsheets (Taken 5/10/2023 0904)  Discharge to home or other facility with appropriate resources:   Identify barriers to discharge with patient and caregiver   Arrange for needed discharge resources and transportation as appropriate   Identify discharge learning needs (meds, wound care, etc)   Arrange for interpreters to assist at discharge as needed   Refer to discharge planning if patient needs post-hospital services based on physician order or complex needs related to functional status, cognitive ability or social support system     Problem: Safety - Adult  Goal: Free from fall injury  Outcome: Progressing     Problem: Chronic Conditions and Co-morbidities  Goal: Patient's chronic conditions and co-morbidity symptoms are monitored and maintained or improved  Outcome: Progressing  Flowsheets (Taken 5/10/2023 0904)  Care Plan - Patient's Chronic Conditions and Co-Morbidity Symptoms are Monitored and Maintained or Improved:   Monitor and assess patient's chronic conditions and comorbid symptoms for stability, deterioration, or improvement   Collaborate with multidisciplinary team to address chronic and comorbid conditions and prevent exacerbation or deterioration   Update acute care plan with appropriate goals if chronic or comorbid symptoms are exacerbated and prevent overall improvement and discharge     Problem: Skin/Tissue Integrity  Goal: Absence of new skin breakdown  Description: 1. Monitor for areas of redness and/or skin breakdown  2. Assess vascular access sites hourly  3. Every 4-6 hours minimum:  Change oxygen saturation probe site  4. Every 4-6 hours:  If on nasal continuous positive airway pressure, respiratory therapy assess nares and determine need for appliance change or resting period.   Outcome: Progressing     Problem: Pain  Goal: Verbalizes/displays adequate comfort

## 2023-05-11 LAB
ANION GAP SERPL CALCULATED.3IONS-SCNC: 12 MMOL/L (ref 4–16)
BUN SERPL-MCNC: 35 MG/DL (ref 6–23)
CALCIUM SERPL-MCNC: 8.3 MG/DL (ref 8.3–10.6)
CHLORIDE BLD-SCNC: 99 MMOL/L (ref 99–110)
CO2: 23 MMOL/L (ref 21–32)
CREAT SERPL-MCNC: 2.3 MG/DL (ref 0.9–1.3)
EKG ATRIAL RATE: 72 BPM
EKG DIAGNOSIS: NORMAL
EKG P AXIS: 60 DEGREES
EKG P-R INTERVAL: 188 MS
EKG Q-T INTERVAL: 436 MS
EKG QRS DURATION: 102 MS
EKG QTC CALCULATION (BAZETT): 477 MS
EKG R AXIS: 58 DEGREES
EKG T AXIS: 73 DEGREES
EKG VENTRICULAR RATE: 72 BPM
GFR SERPL CREATININE-BSD FRML MDRD: 30 ML/MIN/1.73M2
GLUCOSE SERPL-MCNC: 108 MG/DL (ref 70–99)
HCT VFR BLD CALC: 38.9 % (ref 42–52)
HEMOGLOBIN: 12.5 GM/DL (ref 13.5–18)
MCH RBC QN AUTO: 29.6 PG (ref 27–31)
MCHC RBC AUTO-ENTMCNC: 32.1 % (ref 32–36)
MCV RBC AUTO: 92.2 FL (ref 78–100)
PDW BLD-RTO: 15.2 % (ref 11.7–14.9)
PLATELET # BLD: 248 K/CU MM (ref 140–440)
PMV BLD AUTO: 9.9 FL (ref 7.5–11.1)
POTASSIUM SERPL-SCNC: 4.3 MMOL/L (ref 3.5–5.1)
RBC # BLD: 4.22 M/CU MM (ref 4.6–6.2)
SODIUM BLD-SCNC: 134 MMOL/L (ref 135–145)
WBC # BLD: 11.4 K/CU MM (ref 4–10.5)

## 2023-05-11 PROCEDURE — 6370000000 HC RX 637 (ALT 250 FOR IP): Performed by: INTERNAL MEDICINE

## 2023-05-11 PROCEDURE — 97112 NEUROMUSCULAR REEDUCATION: CPT

## 2023-05-11 PROCEDURE — 93010 ELECTROCARDIOGRAM REPORT: CPT | Performed by: INTERNAL MEDICINE

## 2023-05-11 PROCEDURE — 97162 PT EVAL MOD COMPLEX 30 MIN: CPT

## 2023-05-11 PROCEDURE — 99233 SBSQ HOSP IP/OBS HIGH 50: CPT | Performed by: INTERNAL MEDICINE

## 2023-05-11 PROCEDURE — 2580000003 HC RX 258: Performed by: INTERNAL MEDICINE

## 2023-05-11 PROCEDURE — 1200000000 HC SEMI PRIVATE

## 2023-05-11 PROCEDURE — 6370000000 HC RX 637 (ALT 250 FOR IP): Performed by: STUDENT IN AN ORGANIZED HEALTH CARE EDUCATION/TRAINING PROGRAM

## 2023-05-11 PROCEDURE — 80048 BASIC METABOLIC PNL TOTAL CA: CPT

## 2023-05-11 PROCEDURE — 97166 OT EVAL MOD COMPLEX 45 MIN: CPT

## 2023-05-11 PROCEDURE — 94761 N-INVAS EAR/PLS OXIMETRY MLT: CPT

## 2023-05-11 PROCEDURE — 85027 COMPLETE CBC AUTOMATED: CPT

## 2023-05-11 PROCEDURE — APPSS30 APP SPLIT SHARED TIME 16-30 MINUTES

## 2023-05-11 PROCEDURE — 36415 COLL VENOUS BLD VENIPUNCTURE: CPT

## 2023-05-11 PROCEDURE — 2140000000 HC CCU INTERMEDIATE R&B

## 2023-05-11 PROCEDURE — 97530 THERAPEUTIC ACTIVITIES: CPT

## 2023-05-11 RX ORDER — SODIUM CHLORIDE 9 MG/ML
INJECTION, SOLUTION INTRAVENOUS CONTINUOUS
Status: DISPENSED | OUTPATIENT
Start: 2023-05-11 | End: 2023-05-11

## 2023-05-11 RX ADMIN — SODIUM CHLORIDE, PRESERVATIVE FREE 10 ML: 5 INJECTION INTRAVENOUS at 20:59

## 2023-05-11 RX ADMIN — LORAZEPAM 0.25 MG: 0.5 TABLET ORAL at 14:01

## 2023-05-11 RX ADMIN — SODIUM CHLORIDE: 9 INJECTION, SOLUTION INTRAVENOUS at 19:26

## 2023-05-11 RX ADMIN — LORAZEPAM 0.25 MG: 0.5 TABLET ORAL at 08:41

## 2023-05-11 RX ADMIN — LORAZEPAM 0.25 MG: 0.5 TABLET ORAL at 18:38

## 2023-05-11 RX ADMIN — ATORVASTATIN CALCIUM 80 MG: 40 TABLET, FILM COATED ORAL at 20:57

## 2023-05-11 RX ADMIN — METOPROLOL TARTRATE 25 MG: 25 TABLET, FILM COATED ORAL at 08:40

## 2023-05-11 RX ADMIN — GABAPENTIN 300 MG: 300 CAPSULE ORAL at 08:40

## 2023-05-11 RX ADMIN — METOPROLOL TARTRATE 25 MG: 25 TABLET, FILM COATED ORAL at 20:56

## 2023-05-11 RX ADMIN — SODIUM CHLORIDE: 9 INJECTION, SOLUTION INTRAVENOUS at 11:54

## 2023-05-11 RX ADMIN — FENOFIBRATE 54 MG: 54 TABLET, FILM COATED ORAL at 08:46

## 2023-05-11 RX ADMIN — TAMSULOSIN HYDROCHLORIDE 0.4 MG: 0.4 CAPSULE ORAL at 08:40

## 2023-05-11 RX ADMIN — GABAPENTIN 300 MG: 300 CAPSULE ORAL at 20:57

## 2023-05-11 RX ADMIN — ASPIRIN 81 MG CHEWABLE TABLET 81 MG: 81 TABLET CHEWABLE at 08:40

## 2023-05-11 RX ADMIN — SERTRALINE HYDROCHLORIDE 150 MG: 50 TABLET ORAL at 08:41

## 2023-05-11 RX ADMIN — SODIUM CHLORIDE, PRESERVATIVE FREE 10 ML: 5 INJECTION INTRAVENOUS at 08:40

## 2023-05-11 RX ADMIN — GABAPENTIN 300 MG: 300 CAPSULE ORAL at 14:01

## 2023-05-11 RX ADMIN — CLOPIDOGREL BISULFATE 75 MG: 75 TABLET ORAL at 08:41

## 2023-05-11 RX ADMIN — LORAZEPAM 0.25 MG: 0.5 TABLET ORAL at 20:56

## 2023-05-11 RX ADMIN — MIRTAZAPINE 30 MG: 15 TABLET, FILM COATED ORAL at 20:57

## 2023-05-11 RX ADMIN — AMLODIPINE BESYLATE 10 MG: 10 TABLET ORAL at 08:40

## 2023-05-11 NOTE — CARE COORDINATION
CM met with pt for f/u visit for d/c planning. Pt states that he is planning to return to [employer] with his daughter and states that he is active with [facility]. Pt denies any new d/c needs. Notify CM if any d/c needs arise. TE    NOTIFY [facility] [place] [address] [phone] AND FAX THE H&P, AVS, AND HHC ORDERS -[phone] WHEN PT IS DISCHARGED.

## 2023-05-11 NOTE — CARE COORDINATION
PT/OT notified CM that they are recommending SNF for pt. CM met with pt to discuss their recommendations. Pt is agreeable to go to a SNF. He has requested that CM call his daughter/Marita at [phone]. CM called daughter/Marita and she is agreeable for pt to go to a SNF. CM informed her that pt will have to go to [facility] d/t he has Lisa. She is agreeable for referral to be made to [facility]. She has informed CM that she will be moving into a house next week and pt will be living with her. Referral made to [facility] via confidential VM. Will await decision from [facility] to accept or not.   TE

## 2023-05-11 NOTE — DISCHARGE INSTR - COC
Continuity of Care Form    Patient Name: Lyn Singh   :  1950  MRN:  [id number]    Admit date:  5/3/2023  Discharge date:  ***    Code Status Order: Full Code   Advance Directives:   Advance Care Flowsheet Documentation       Date/Time Healthcare Directive Type of Healthcare Directive Copy in [address] Agent's Name Healthcare Agent's Phone Number    05/10/23 4359 No, patient does not have an advance directive for healthcare treatment -- -- -- -- --            Admitting Physician:  Shira Ace MD  PCP: No primary care provider on file. Discharging Nurse: [facility] Unit/Room#: 7137/3477-O  Discharging Unit Phone Number: [phone]    Emergency Contact:   Extended Emergency Contact Information  Primary Emergency Contact: melody glaser  Home Phone: [phone]  Relation: Child    Past Surgical History:  Past Surgical History:   Procedure Laterality Date    KNEE ARTHROPLASTY         Immunization History: There is no immunization history on file for this patient. Active Problems:  Patient Active Problem List   Diagnosis Code    Hypertensive urgency I16.0    CVA (cerebral vascular accident) ([facility] [address] I63.9    Benign prostate hyperplasia N40.0    Cerebral artery occlusion I66.9       Isolation/Infection:   Isolation            Contact          Patient Infection Status       Infection Onset Added Last Indicated Last Indicated By Review Planned Expiration Resolved Resolved By    [facility] 21 Bib Glover RN        Added from external infection.  [address]            Nurse Assessment:  Last Vital Signs: /72   Pulse 66   Temp 97.5 °F (36.4 °C) (Oral)   Resp 19   Ht 5' 10\" (1.778 m)   Wt 158 lb 4.8 oz (71.8 kg)   SpO2 97%   BMI 22.71 kg/m²     Last documented pain score (0-10 scale): Pain Level: 5  Last Weight:   Wt Readings from Last 1 Encounters:   05/10/23 158 lb 4.8 oz (71.8 kg)     Mental Status:

## 2023-05-12 LAB
ANION GAP SERPL CALCULATED.3IONS-SCNC: 11 MMOL/L (ref 4–16)
BUN SERPL-MCNC: 28 MG/DL (ref 6–23)
CALCIUM SERPL-MCNC: 8.4 MG/DL (ref 8.3–10.6)
CHLORIDE BLD-SCNC: 103 MMOL/L (ref 99–110)
CO2: 21 MMOL/L (ref 21–32)
CREAT SERPL-MCNC: 2 MG/DL (ref 0.9–1.3)
GFR SERPL CREATININE-BSD FRML MDRD: 35 ML/MIN/1.73M2
GLUCOSE SERPL-MCNC: 112 MG/DL (ref 70–99)
HCT VFR BLD CALC: 35.3 % (ref 42–52)
HEMOGLOBIN: 11.4 GM/DL (ref 13.5–18)
MCH RBC QN AUTO: 29.8 PG (ref 27–31)
MCHC RBC AUTO-ENTMCNC: 32.3 % (ref 32–36)
MCV RBC AUTO: 92.4 FL (ref 78–100)
PDW BLD-RTO: 15.1 % (ref 11.7–14.9)
PLATELET # BLD: 232 K/CU MM (ref 140–440)
PMV BLD AUTO: 10.4 FL (ref 7.5–11.1)
POTASSIUM SERPL-SCNC: 4.2 MMOL/L (ref 3.5–5.1)
RBC # BLD: 3.82 M/CU MM (ref 4.6–6.2)
SODIUM BLD-SCNC: 135 MMOL/L (ref 135–145)
WBC # BLD: 6.8 K/CU MM (ref 4–10.5)

## 2023-05-12 PROCEDURE — 6370000000 HC RX 637 (ALT 250 FOR IP): Performed by: INTERNAL MEDICINE

## 2023-05-12 PROCEDURE — 97530 THERAPEUTIC ACTIVITIES: CPT

## 2023-05-12 PROCEDURE — 2580000003 HC RX 258: Performed by: INTERNAL MEDICINE

## 2023-05-12 PROCEDURE — 85027 COMPLETE CBC AUTOMATED: CPT

## 2023-05-12 PROCEDURE — 6370000000 HC RX 637 (ALT 250 FOR IP): Performed by: STUDENT IN AN ORGANIZED HEALTH CARE EDUCATION/TRAINING PROGRAM

## 2023-05-12 PROCEDURE — 51798 US URINE CAPACITY MEASURE: CPT

## 2023-05-12 PROCEDURE — 97110 THERAPEUTIC EXERCISES: CPT

## 2023-05-12 PROCEDURE — 94761 N-INVAS EAR/PLS OXIMETRY MLT: CPT

## 2023-05-12 PROCEDURE — 1200000000 HC SEMI PRIVATE

## 2023-05-12 PROCEDURE — 80048 BASIC METABOLIC PNL TOTAL CA: CPT

## 2023-05-12 PROCEDURE — 99233 SBSQ HOSP IP/OBS HIGH 50: CPT | Performed by: INTERNAL MEDICINE

## 2023-05-12 PROCEDURE — 36415 COLL VENOUS BLD VENIPUNCTURE: CPT

## 2023-05-12 RX ADMIN — LORAZEPAM 0.25 MG: 0.5 TABLET ORAL at 14:08

## 2023-05-12 RX ADMIN — GABAPENTIN 300 MG: 300 CAPSULE ORAL at 20:02

## 2023-05-12 RX ADMIN — ACETAMINOPHEN 650 MG: 325 TABLET ORAL at 20:02

## 2023-05-12 RX ADMIN — SERTRALINE HYDROCHLORIDE 150 MG: 50 TABLET ORAL at 10:25

## 2023-05-12 RX ADMIN — AMLODIPINE BESYLATE 10 MG: 10 TABLET ORAL at 09:24

## 2023-05-12 RX ADMIN — MIRTAZAPINE 30 MG: 15 TABLET, FILM COATED ORAL at 20:02

## 2023-05-12 RX ADMIN — LORAZEPAM 0.25 MG: 0.5 TABLET ORAL at 20:01

## 2023-05-12 RX ADMIN — LORAZEPAM 0.25 MG: 0.5 TABLET ORAL at 18:04

## 2023-05-12 RX ADMIN — FENOFIBRATE 54 MG: 54 TABLET, FILM COATED ORAL at 09:55

## 2023-05-12 RX ADMIN — LORAZEPAM 0.25 MG: 0.5 TABLET ORAL at 09:22

## 2023-05-12 RX ADMIN — METOPROLOL TARTRATE 25 MG: 25 TABLET, FILM COATED ORAL at 10:25

## 2023-05-12 RX ADMIN — SODIUM CHLORIDE, PRESERVATIVE FREE 10 ML: 5 INJECTION INTRAVENOUS at 11:48

## 2023-05-12 RX ADMIN — TAMSULOSIN HYDROCHLORIDE 0.4 MG: 0.4 CAPSULE ORAL at 10:25

## 2023-05-12 RX ADMIN — GABAPENTIN 300 MG: 300 CAPSULE ORAL at 09:23

## 2023-05-12 RX ADMIN — ASPIRIN 81 MG CHEWABLE TABLET 81 MG: 81 TABLET CHEWABLE at 10:34

## 2023-05-12 RX ADMIN — ATORVASTATIN CALCIUM 80 MG: 40 TABLET, FILM COATED ORAL at 20:01

## 2023-05-12 RX ADMIN — GABAPENTIN 300 MG: 300 CAPSULE ORAL at 14:08

## 2023-05-12 RX ADMIN — METOPROLOL TARTRATE 25 MG: 25 TABLET, FILM COATED ORAL at 20:02

## 2023-05-12 RX ADMIN — CLOPIDOGREL BISULFATE 75 MG: 75 TABLET ORAL at 09:23

## 2023-05-13 VITALS
RESPIRATION RATE: 15 BRPM | DIASTOLIC BLOOD PRESSURE: 63 MMHG | OXYGEN SATURATION: 93 % | BODY MASS INDEX: 23.15 KG/M2 | WEIGHT: 161.7 LBS | TEMPERATURE: 98.5 F | HEART RATE: 67 BPM | HEIGHT: 70 IN | SYSTOLIC BLOOD PRESSURE: 111 MMHG

## 2023-05-13 LAB
ANION GAP SERPL CALCULATED.3IONS-SCNC: 14 MMOL/L (ref 4–16)
BUN SERPL-MCNC: 32 MG/DL (ref 6–23)
CALCIUM SERPL-MCNC: 8.4 MG/DL (ref 8.3–10.6)
CHLORIDE BLD-SCNC: 102 MMOL/L (ref 99–110)
CO2: 20 MMOL/L (ref 21–32)
CREAT SERPL-MCNC: 2.1 MG/DL (ref 0.9–1.3)
GFR SERPL CREATININE-BSD FRML MDRD: 33 ML/MIN/1.73M2
GLUCOSE SERPL-MCNC: 168 MG/DL (ref 70–99)
HCT VFR BLD CALC: 36.8 % (ref 42–52)
HEMOGLOBIN: 11.3 GM/DL (ref 13.5–18)
MCH RBC QN AUTO: 29.4 PG (ref 27–31)
MCHC RBC AUTO-ENTMCNC: 30.7 % (ref 32–36)
MCV RBC AUTO: 95.8 FL (ref 78–100)
PDW BLD-RTO: 15.1 % (ref 11.7–14.9)
PLATELET # BLD: 212 K/CU MM (ref 140–440)
PMV BLD AUTO: 10.3 FL (ref 7.5–11.1)
POTASSIUM SERPL-SCNC: 4.3 MMOL/L (ref 3.5–5.1)
RBC # BLD: 3.84 M/CU MM (ref 4.6–6.2)
SODIUM BLD-SCNC: 136 MMOL/L (ref 135–145)
WBC # BLD: 7.9 K/CU MM (ref 4–10.5)

## 2023-05-13 PROCEDURE — 94761 N-INVAS EAR/PLS OXIMETRY MLT: CPT

## 2023-05-13 PROCEDURE — 36415 COLL VENOUS BLD VENIPUNCTURE: CPT

## 2023-05-13 PROCEDURE — 6370000000 HC RX 637 (ALT 250 FOR IP): Performed by: STUDENT IN AN ORGANIZED HEALTH CARE EDUCATION/TRAINING PROGRAM

## 2023-05-13 PROCEDURE — 85027 COMPLETE CBC AUTOMATED: CPT

## 2023-05-13 PROCEDURE — 6370000000 HC RX 637 (ALT 250 FOR IP): Performed by: INTERNAL MEDICINE

## 2023-05-13 PROCEDURE — 80048 BASIC METABOLIC PNL TOTAL CA: CPT

## 2023-05-13 RX ORDER — CLOPIDOGREL BISULFATE 75 MG/1
75 TABLET ORAL DAILY
Qty: 30 TABLET | Refills: 3 | Status: SHIPPED | OUTPATIENT
Start: 2023-05-14

## 2023-05-13 RX ORDER — ATORVASTATIN CALCIUM 80 MG/1
80 TABLET, FILM COATED ORAL NIGHTLY
Qty: 30 TABLET | Refills: 3 | Status: SHIPPED | OUTPATIENT
Start: 2023-05-13

## 2023-05-13 RX ADMIN — LORAZEPAM 0.25 MG: 0.5 TABLET ORAL at 10:12

## 2023-05-13 RX ADMIN — ACETAMINOPHEN 650 MG: 325 TABLET ORAL at 05:54

## 2023-05-13 RX ADMIN — TAMSULOSIN HYDROCHLORIDE 0.4 MG: 0.4 CAPSULE ORAL at 10:13

## 2023-05-13 RX ADMIN — GABAPENTIN 300 MG: 300 CAPSULE ORAL at 10:13

## 2023-05-13 RX ADMIN — SERTRALINE HYDROCHLORIDE 150 MG: 50 TABLET ORAL at 10:12

## 2023-05-13 RX ADMIN — LORAZEPAM 0.25 MG: 0.5 TABLET ORAL at 15:36

## 2023-05-13 RX ADMIN — AMLODIPINE BESYLATE 10 MG: 10 TABLET ORAL at 10:13

## 2023-05-13 RX ADMIN — FENOFIBRATE 54 MG: 54 TABLET, FILM COATED ORAL at 10:23

## 2023-05-13 RX ADMIN — METOPROLOL TARTRATE 25 MG: 25 TABLET, FILM COATED ORAL at 10:13

## 2023-05-13 RX ADMIN — ASPIRIN 81 MG CHEWABLE TABLET 81 MG: 81 TABLET CHEWABLE at 10:12

## 2023-05-13 RX ADMIN — GABAPENTIN 300 MG: 300 CAPSULE ORAL at 15:37

## 2023-05-13 RX ADMIN — CLOPIDOGREL BISULFATE 75 MG: 75 TABLET ORAL at 10:13

## 2023-05-13 ASSESSMENT — PAIN DESCRIPTION - LOCATION: LOCATION: HEAD

## 2023-05-13 ASSESSMENT — PAIN SCALES - GENERAL
PAINLEVEL_OUTOF10: 0
PAINLEVEL_OUTOF10: 5

## 2023-05-13 ASSESSMENT — PAIN DESCRIPTION - DESCRIPTORS: DESCRIPTORS: ACHING

## 2023-05-13 NOTE — DISCHARGE SUMMARY
V2.0  Discharge Summary    Name:  Lis Bush /Age/Sex: 1950 (72 y.o. male)   Admit Date: 5/3/2023  Discharge Date: 23    MRN & CSN:  [id number] & [id number] Encounter Date and Time 23 4:33 PM EDT    Attending:  Mrogan Segura MD Discharging Provider: Morgan Segura MD       Hospital Course:     Brief HPI: Lis Bush is a 72 y.o. male who presented with ***  Discharge from renal standpoint after discussion with nephrologist    Brief Problem Based Course:   ***      The patient expressed appropriate understanding of, and agreement with the discharge recommendations, medications, and plan. Consults this admission:  IP CONSULT TO CASE MANAGEMENT  IP CONSULT TO NEPHROLOGY  IP CONSULT TO CARDIOLOGY  IP CONSULT TO CARDIAC REHAB  IP CONSULT TO CARDIAC REHAB    Discharge Diagnosis:   Hypertensive urgency    ***    Discharge Instruction:   Follow up appointments: ***   Primary care physician: No primary care provider on file. within 2 weeks  Diet: {diet:04353}   Activity: {discharge activity:43336}  Disposition: Discharged to:   []Home, [][facility], []SNF, []Acute Rehab, []Hospice ***  Condition on discharge: Stable  Labs and Tests to be Followed up as an outpatient by PCP or Specialist: ***    Discharge Medications:        Medication List        START taking these medications      atorvastatin 80 MG tablet  Commonly known as: LIPITOR  Take 1 tablet by mouth nightly     clopidogrel 75 MG tablet  Commonly known as: PLAVIX  Take 1 tablet by mouth daily  Start taking on:  May 14, 2023            CONTINUE taking these medications      amLODIPine 10 MG tablet  Commonly known as: NORVASC     aspirin 81 MG EC tablet     fenofibrate 48 MG tablet  Commonly known as: TRICOR     metoprolol tartrate 50 MG tablet  Commonly known as: LOPRESSOR     mirtazapine 30 MG tablet  Commonly known as: REMERON     MULTIVITAMIN ADULT PO     sertraline 100 MG tablet  Commonly known as: ZOLOFT     tamsulosin 0.4 MG capsule  Commonly known

## 2023-05-13 NOTE — PROGRESS NOTES
05/10/23 1507   Encounter Summary   Encounter Overview/Reason  Initial Encounter   Service Provided For: Patient not available  (Patient went out for procedure said, his daughter.  This  offered prayed with patient's daughter and granddaughter.)   Referral/Consult From: [address] [place] [address] Children;Family members   Last Encounter  05/10/23  (Provided family support.)   Complexity of Encounter Low   Begin Time 1507   End Time  1514   Total Time Calculated 7 min   Encounter    Type Initial Screen/Assessment   Spiritual/Emotional needs   Type Spiritual Support   Grief, Loss, and Adjustments   Type Life Adjustments
Bladder scan showed 47 ml in bladder
Cardiology Progress Note     Admit Date:  5/3/2023    Consult reason/ Seen today for :       Subjective and  Overnight Events :  He underwent complex PCI of Circ and OM   Cardiac cath revealed multivessel coronary artery disease discussed with CT surgery not a candidate for CABG  Plan multivessel staged PCI d/w cath la [facility] team   Creatinine is higher today      Chief complain on admission : 72 y. o.year old who is admitted for  Chief Complaint   Patient presents with    Hypertension      Assessment / Plan:  NSTEMI with abnormal troponin level we will proceed with multivessel staged PCI due to renal failure cussed with CT surgery extensively, started aspirin and plavix   Uncontrolled hypertension much improved on metoprolol 25 twice daily daily and amlodipine 10 mg daily titrate up as needed   CKD renal function improved creatinine stable at 1.9 we will do staged procedure to give not more than 80 to 100 cc contrast at  Valsartan on hold due to renal failure  Normally follows up at [place]  We will plan RCA and LAD intervention in about 2 to 4 weeks once renal function stabilizes  History of CVA carotid enterectomy currently stable continue antiplatelet statins  DVT Prophylaxis if no contraindication  Discharge from cardiology point, follow in office    Past medical history:    has a past medical history of Cerebral artery occlusion with cerebral infarction ([facility] [address] and Hypertension. Past surgical history:   has a past surgical history that includes Knee Arthroplasty. Social History:   reports that he has been smoking cigarettes. He has been smoking an average of 1.5 packs per day. He does not have any smokeless tobacco history on file. He reports that he does not currently use alcohol. He reports that he does not use drugs. Family history:  family history is not on file.     Allergies   Allergen Reactions    Cortisone        Review of Systems:
Cardiology Progress Note     Admit Date:  5/3/2023    Consult reason/ Seen today for :       Subjective and  Overnight Events :  He underwent complex PCI of Circ and OM   Renal function worsened but improving now  Cardiac cath revealed multivessel coronary artery disease discussed with CT surgery not a candidate for CABG    Creatinine is trending down now      Chief complain on admission : 72 y. o.year old who is admitted for  Chief Complaint   Patient presents with    Hypertension      Assessment / Plan:  NSTEMI with abnormal troponin level we will proceed with multivessel staged PCI due to renal failure cussed with CT surgery extensively, started aspirin and plavix   Uncontrolled hypertension much improved on metoprolol 25 twice daily daily and amlodipine 10 mg daily titrate up as needed   CKD renal function improved creatinine stable at 1.9 we will do staged procedure to give not more than 80 to 100 cc contrast at  Valsartan on hold due to renal failure can restart once cleared by nephrology  Normally follows up at [address]  We will plan RCA and LAD intervention in about 2 to 4 weeks once renal function stabilizes follow-up in office  History of CVA carotid enterectomy currently stable continue antiplatelet statins  DVT Prophylaxis if no contraindication  Discharge from cardiology point, follow in office in 1 to 2 weeks for staged RCA and LAD intervention  Discussed with primary team and patient    Past medical history:    has a past medical history of Cerebral artery occlusion with cerebral infarction ([address] and Hypertension. Past surgical history:   has a past surgical history that includes Knee Arthroplasty. Social History:   reports that he has been smoking cigarettes. He has been smoking an average of 1.5 packs per day. He does not have any smokeless tobacco history on file. He reports that he does not currently use alcohol.  He reports
Cardiology Progress Note     Admit Date:  5/3/2023    Consult reason/ Seen today for :       Subjective and  Overnight Events : Cardiac cath revealed multivessel coronary artery disease discussed with CT surgery not a candidate for CABG  Plan multivessel staged PCI d/w cath [facility] team       Chief complain on admission : 72 y. o.year old who is admitted for  Chief Complaint   Patient presents with    Hypertension      Assessment / Plan:  NSTEMI with abnormal troponin level we will proceed with multivessel staged PCI due to renal failure cussed with CT surgery extensively, started aspirin and plavix   Uncontrolled hypertension much improved on metoprolol 25 twice daily valsartan 160 mg daily and amlodipine 10 mg daily titrate up as needed   CKD renal function improved creatinine stable at 1.9 we will do staged procedure to give not more than 80 to 100 cc contrast at  Normally follows up at [place]  History of CVA carotid enterectomy currently stable continue antiplatelet statins  DVT Prophylaxis if no contraindication  N.p.o. after midnight    Past medical history:    has a past medical history of Cerebral artery occlusion with cerebral infarction ([address] and Hypertension. Past surgical history:   has a past surgical history that includes Knee Arthroplasty. Social History:   reports that he has been smoking cigarettes. He has been smoking an average of 1.5 packs per day. He does not have any smokeless tobacco history on file. He reports that he does not currently use alcohol. He reports that he does not use drugs. Family history:  family history is not on file.     Allergies   Allergen Reactions    Cortisone        Review of Systems:    All 14 systems were reviewed and are negative  Except for the positive findings  which as documented     /61   Pulse 62   Temp 98 °F (36.7 °C) (Oral)   Resp 17   Ht 5' 10\" (1.778 m)   Wt 158 lb 4.8 oz
Cardiology Progress Note    Subjective/Overnight Events:  Patient currently syncopal event at this time without any significant complaints. Complaint and results of catheter and need for further intervention later date. Discussed with him that we are still debating on intervening on his LAD lesion. Advised patient on limitations.  -No lifting over 20 pounds for 7-10 days.  -Avoid driving for 76-14 hours  -No water submersion (bath, hot tub, swimming), but okay to shower. Some bruising is common and should not be alarming. Advised patient to return to emergency department ASAP if they notice bleeding bright red blood. Potential life threatening condition. Informed to contact the office if they notice new fever, excessive warmth, redness, swelling, palpable mass, or pus draining from site. Assessment/Plan:  NSTEMI with multivessel coronary artery disease s/p PCI of OM1 + LCx 5/10/2023  -Denying chest pain at this time. -C per below. Severe multivessel disease.  -Case discussed with CT surgery, poor CABG candidate at this time  -Patient had complex PCI of OM 1 and circumflex artery. Patient also need intervention on LAD at later date  -Continue aspirin, Plavix, Lipitor 80, Lopressor 25 mg twice daily  Uncontrolled hypertension  -Significantly improved  -Valsartan 160 mg twice daily on hold due to SHAUN  SHAUN on suspected CKD  -Creatinine of 2.3, following heart cath.   Nephrology following  Carotid artery stenosis s/p Right carotid enterectomy   History of CVA      LHC: 05/08/23  LMCA: Normal.  LAD: Abnormal.70% stenosis at D3 , bifurcation lesion  Wrap around LAD  D1 is subtotalled and smaller vessel     LCx: Abnormal.mid LCX 99% stenosis prior to giving off two large OM  OM1 70% and OM2 90% stenosis     RCA: Abnormal.70 % stenosis in mid RCA  90% stenosis distal RCA  bifurcates into Rt PL and Rt PDA     LHC: 05/10/23  2.5 mm resolute Freeman stent to left circumflex into OM1 post dilated with 3 mm NC
Cardiology Progress Note    Subjective/Overnight Events:  Patient resting comfortably at this time. No complaining of any discomfort at cath insertion site. Originally denied undergoing LHC. Case discussed with CT surgery team, plan for staged multivessel PCI. Patient agreeable with this plan. Informed him that we will attempt to undergo repeat left heart cath tomorrow for coronary intervention. Assessment/Plan:  NSTEMI with multivessel coronary artery disease  -Denying chest pain at this time. -LHC per below. Severe multivessel disease.  -Case discussed with CT surgery, poor CABG candidate at this time  -Plan for repeat heart catheterization tomorrow for staged PCI. NPO after midnight  -Continue aspirin, Plavix, Lipitor 80, Lopressor 25 mg twice daily  Uncontrolled hypertension  -Significantly improved  -Continue valsartan 160 mg twice daily  SHAUN on suspected CKD  -Creatinine of 1.8. Nephrology were  Carotid artery stenosis s/p Right carotid enterectomy   History of CVA      LHC: 05/08/23  LMCA: Normal.  LAD: Abnormal.70% stenosis at D3 , bifurcation lesion  Wrap around LAD  D1 is subtotalled and smaller vessel     LCx: Abnormal.mid LCX 99% stenosis prior to giving off two large OM  OM1 70% and OM2 90% stenosis     RCA: Abnormal.70 % stenosis in mid RCA  90% stenosis distal RCA  bifurcates into Rt PL and Rt PDA       Physical Exam  Constitutional:       General: He is not in acute distress. Appearance: He is not diaphoretic. HENT:      Head: Normocephalic and atraumatic. Mouth/Throat:      Mouth: Mucous membranes are moist.   Eyes:      Extraocular Movements: Extraocular movements intact. Comments: Pupils equal and round   Cardiovascular:      Rate and Rhythm: Normal rate and regular rhythm. Pulses: Normal pulses. Heart sounds: S1 normal and S2 normal. No murmur heard. No friction rub. No gallop.       Comments: right wrist site dressing is clean dry and intact, site is
Cardiology Progress Note    Subjective/Overnight Events:  Patient resting comfortably at this time. Not complaining of any discomfort at cath insertion site. Patient is nervous about undergoing heart catheterization today due to the severity of his disease. Discussed with patients the risks and benefits of the procedure and that the benefits outweigh the risks at this time. All of his questions were answered, reassurance given. Plan for multivessel staged PCI today. Assessment/Plan:  NSTEMI with multivessel coronary artery disease  -Denying chest pain at this time. -C per below. Severe multivessel disease.  -Case discussed with CT surgery, poor CABG candidate at this time  -Plan for repeat heart catheterization today for staged PCI. -Continue aspirin, Plavix, Lipitor 80, Lopressor 25 mg twice daily  Uncontrolled hypertension  -Significantly improved  -Continue valsartan 160 mg twice daily  SHAUN on suspected CKD  -Creatinine of 1.9, stable at this time. Nephrology following  Carotid artery stenosis s/p Right carotid enterectomy   History of CVA      [facility]: 05/08/23  LMCA: Normal.  LAD: Abnormal.70% stenosis at D3 , bifurcation lesion  Wrap around LAD  D1 is subtotalled and smaller vessel     LCx: Abnormal.mid LCX 99% stenosis prior to giving off two large OM  OM1 70% and OM2 90% stenosis     RCA: Abnormal.70 % stenosis in mid RCA  90% stenosis distal RCA  bifurcates into Rt PL and Rt PDA       Physical Exam  Constitutional:       General: He is not in acute distress. Appearance: He is not diaphoretic. HENT:      Head: Normocephalic and atraumatic. Mouth/Throat:      Mouth: Mucous membranes are moist.   Eyes:      Extraocular Movements: Extraocular movements intact. Comments: Pupils equal and round   Cardiovascular:      Rate and Rhythm: Normal rate and regular rhythm. Pulses: Normal pulses. Heart sounds: S1 normal and S2 normal. No murmur heard. No friction rub.  No
Comprehensive Nutrition Assessment    Type and Reason for Visit:  Reassess    Nutrition Recommendations/Plan:   Continue current diet and oral nutrition supplement, between meals     Malnutrition Assessment:  Malnutrition Status: At risk for malnutrition (05/06/23 0946)    Context:  Chronic Illness       Nutrition Assessment:    Pt consuming 76% to all of recent meals. Oral supplement bid to optimize intake. Will continue to follow as moderate nutrition risk. Nutrition Related Findings:    BUN 28, Cr 2.0, GFR 35    Remeron   Wound Type: None       Current Nutrition Intake & Therapies:    Average Meal Intake: %  Average Supplements Intake: Unable to assess  ADULT DIET; Regular; Low Fat/Low Chol/High Fiber/2 gm Na  ADULT ORAL NUTRITION SUPPLEMENT; Breakfast, Lunch; Standard High Calorie/High Protein Oral Supplement    Anthropometric Measures:  Height: 5' 10\" (177.8 cm)  Ideal Body Weight (IBW): 166 lbs (75 kg)    Admission Body Weight: 151 lb 6.4 oz (68.7 kg)  Current Body Weight: 158 lb 4.6 oz (71.8 kg), 95.4 % IBW.  Weight Source: Bed Scale  Current BMI (kg/m2): 22.7  Usual Body Weight: 180 lb (81.6 kg) (per pt)  % Weight Change (Calculated): -15.9  Weight Adjustment For: No Adjustment                 BMI Categories: Normal Weight (BMI 22.0 to 24.9) age over 72    Estimated Daily Nutrient Needs:  Energy Requirements Based On: Kcal/kg  Weight Used for Energy Requirements: Current  Energy (kcal/day): 1022-2149 (25-30kcal/kg)  Weight Used for Protein Requirements: Ideal  Protein (g/day): 76-91 (1.0-1.2g/kg IBW)  Method Used for Fluid Requirements: 1 ml/kcal  Fluid (ml/day): 1900    Nutrition Diagnosis:   Predicted inadequate energy intake related to cognitive or neurological impairment (physical limitations) as evidenced by weight loss    Nutrition Interventions:   Food and/or Nutrient Delivery: Continue Current Diet, Continue Oral Nutrition Supplement  Nutrition Education/Counseling: Education not
Comprehensive Nutrition Assessment    Type and Reason for Visit:  Reassess    Nutrition Recommendations/Plan:   Reordered nutrition supplement. Please encourage and record PO Intake TID      Malnutrition Assessment:  Malnutrition Status: At risk for malnutrition (Comment) (05/06/23 7172)    Context:  Chronic Illness       Nutrition Assessment:    Pt with no PO intake recorded since last assessment. Supplement was cancelled when pt NPO. Will reorder and continue to monitor. Please record PO intakes. Nutrition Related Findings:    cr 1.9, NS at 77, on remeron Wound Type: None       Current Nutrition Intake & Therapies:    Average Meal Intake: %  Average Supplements Intake: Unable to assess, None Ordered  ADULT DIET; Regular; Low Fat/Low Chol/High Fiber/2 gm Na  ADULT ORAL NUTRITION SUPPLEMENT; Breakfast, Lunch; Standard High Calorie/High Protein Oral Supplement    Anthropometric Measures:  Height: 5' 10\" (177.8 cm)  Ideal Body Weight (IBW): 166 lbs (75 kg)    Admission Body Weight: 183 lb (83 kg) (no source)  Current Body Weight: 158 lb 4.6 oz (71.8 kg), 95.4 % IBW.  Weight Source: Bed Scale  Current BMI (kg/m2): 22.7  Usual Body Weight: 180 lb (81.6 kg) (per pt)  % Weight Change (Calculated): -15.9  Weight Adjustment For: No Adjustment                 BMI Categories: Underweight (BMI less than 22) age over 72    Estimated Daily Nutrient Needs:  Energy Requirements Based On: Kcal/kg  Weight Used for Energy Requirements: Current  Energy (kcal/day): 0847-2034 (25-30kcal/kg)  Weight Used for Protein Requirements: Ideal  Protein (g/day): 76-91 (1.0-1.2g/kg IBW)  Method Used for Fluid Requirements: 1 ml/kcal  Fluid (ml/day): 1900    Nutrition Diagnosis:   Predicted inadequate energy intake related to cognitive or neurological impairment (physical limitations) as evidenced by weight loss    Nutrition Interventions:   Food and/or Nutrient Delivery: Continue Current Diet, Start Oral Nutrition Supplement  Nutrition
Nephrology Progress Note        [address], [address] [address]  Phone: [phone]  Office Hours: 8:30AM - 4:30PM  Monday - Friday 5/9/2023 7:14 AM  Subjective:   Admit Date: 5/3/2023  PCP: No primary care provider on file. Interval History:   On room air    Diet: ADULT DIET; Regular; Low Sodium (2 gm)  ADULT ORAL NUTRITION SUPPLEMENT; Breakfast, Lunch, Dinner; Standard 4 oz Oral Supplement      Data:   Scheduled Meds:   valsartan  160 mg Oral BID    atorvastatin  80 mg Oral Nightly    amLODIPine  10 mg Oral Daily    fenofibrate  54 mg Oral Daily    metoprolol tartrate  25 mg Oral BID    mirtazapine  30 mg Oral Nightly    sertraline  150 mg Oral Daily    tamsulosin  0.4 mg Oral Daily    sodium chloride flush  5-40 mL IntraVENous 2 times per day    LORazepam  0.25 mg Oral 4x Daily    aspirin  81 mg Oral Daily     Continuous Infusions:   sodium chloride 75 mL/hr at 05/07/23 1323    sodium chloride       PRN Meds:sodium chloride flush, sodium chloride, ondansetron **OR** ondansetron, polyethylene glycol, acetaminophen **OR** acetaminophen  I/O last 3 completed shifts: In: 56 [P.O.:600; I.V.:10]  Out: 1800 [Urine:1800]  No intake/output data recorded.     Intake/Output Summary (Last 24 hours) at 5/9/2023 0714  Last data filed at 5/9/2023 0445  Gross per 24 hour   Intake 610 ml   Output 1800 ml   Net -1190 ml       CBC:   Recent Labs     05/07/23  0339   WBC 8.3   HGB 13.0*          BMP:    Recent Labs     05/07/23  0339 05/08/23  0602 05/09/23  0217    137 137   K 3.8 3.9 4.0    102 102   CO2 21 22 23   BUN 27* 26* 26*   CREATININE 2.0* 1.8* 1.8*   GLUCOSE 102* 105* 99         Objective:   Vitals: BP (!) 178/77   Pulse 66   Temp 97.6 °F (36.4 °C) (Oral)   Resp 18   Ht 5' 10\" (1.778 m)   Wt 158 lb 1.6 oz (71.7 kg)   SpO2 96%   BMI 22.68 kg/m²   General appearance: in no acute distress  HEENT: normocephalic, atraumatic,   Neck: supple, trachea
Nephrology Progress Note        [address], [address] [address]  Phone: [phone]  Office Hours: 8:30AM - 4:30PM  Monday - Friday        5/10/2023 7:59 AM  Subjective:   Admit Date: 5/3/2023  PCP: No primary care provider on file. Interval History:   Doing ok  Diet: Diet NPO Exceptions are: Sips of Water with Meds, Ice Chips      Data:   Scheduled Meds:   valsartan  160 mg Oral BID    clopidogrel  75 mg Oral Daily    gabapentin  300 mg Oral TID    atorvastatin  80 mg Oral Nightly    amLODIPine  10 mg Oral Daily    fenofibrate  54 mg Oral Daily    metoprolol tartrate  25 mg Oral BID    mirtazapine  30 mg Oral Nightly    sertraline  150 mg Oral Daily    tamsulosin  0.4 mg Oral Daily    sodium chloride flush  5-40 mL IntraVENous 2 times per day    LORazepam  0.25 mg Oral 4x Daily    aspirin  81 mg Oral Daily     Continuous Infusions:   sodium chloride 75 mL/hr at 05/07/23 1323    sodium chloride       PRN Meds:sodium chloride flush, sodium chloride, ondansetron **OR** ondansetron, polyethylene glycol, acetaminophen **OR** acetaminophen  I/O last 3 completed shifts: In: 56 [P.O.:600; I.V.:10]  Out: 2025 [Urine:2025]  I/O this shift:  In: -   Out: 300 [Urine:300]    Intake/Output Summary (Last 24 hours) at 5/10/2023 0759  Last data filed at 5/10/2023 0735  Gross per 24 hour   Intake --   Output 1300 ml   Net -1300 ml       CBC: No results for input(s): WBC, HGB, PLT in the last 72 hours. BMP:    Recent Labs     05/08/23  0602 05/09/23  0217 05/10/23  0600    137 135   K 3.9 4.0 4.4    102 100   CO2 22 23 23   BUN 26* 26* 28*   CREATININE 1.8* 1.8* 1.9*   GLUCOSE 105* 99 94     Hepatic: No results for input(s): AST, ALT, ALB, BILITOT, ALKPHOS in the last 72 hours. Troponin: No results for input(s): TROPONINI in the last 72 hours. BNP: No results for input(s): BNP in the last 72 hours. Lipids: No results for input(s): CHOL, HDL in the last 72 hours.     Invalid input(s):
Nephrology Progress Note        [address], [address] [address]  Phone: [phone]  Office Hours: 8:30AM - 4:30PM  Monday - Friday 5/12/2023 7:43 AM  Subjective:   Admit Date: 5/3/2023  PCP: No primary care provider on file. Interval History:   On room air  Nonoliguric  BP is ok    Diet: ADULT DIET; Regular; Low Fat/Low Chol/High Fiber/2 gm Na  ADULT ORAL NUTRITION SUPPLEMENT; Breakfast, Lunch; Standard High Calorie/High Protein Oral Supplement      Data:   Scheduled Meds:   sodium chloride flush  5-40 mL IntraVENous 2 times per day    atorvastatin  80 mg Oral Nightly    aspirin  81 mg Oral Daily    clopidogrel  75 mg Oral Daily    [Held by provider] valsartan  160 mg Oral BID    gabapentin  300 mg Oral TID    amLODIPine  10 mg Oral Daily    fenofibrate  54 mg Oral Daily    metoprolol tartrate  25 mg Oral BID    mirtazapine  30 mg Oral Nightly    sertraline  150 mg Oral Daily    tamsulosin  0.4 mg Oral Daily    sodium chloride flush  5-40 mL IntraVENous 2 times per day    LORazepam  0.25 mg Oral 4x Daily     Continuous Infusions:   sodium chloride      sodium chloride       PRN Meds:sodium chloride flush, sodium chloride, acetaminophen, sodium chloride flush, sodium chloride, ondansetron **OR** ondansetron, polyethylene glycol, [DISCONTINUED] acetaminophen **OR** acetaminophen  I/O last 3 completed shifts: In: 720 [P.O.:720]  Out: 3025 [Urine:3025]  No intake/output data recorded.     Intake/Output Summary (Last 24 hours) at 5/12/2023 0743  Last data filed at 5/11/2023 2052  Gross per 24 hour   Intake 720 ml   Output 2175 ml   Net -1455 ml       CBC:   Recent Labs     05/11/23  0253 05/12/23  0409   WBC 11.4* 6.8   HGB 12.5* 11.4*    232       BMP:    Recent Labs     05/10/23  0600 05/11/23  0253 05/12/23  0409    134* 135   K 4.4 4.3 4.2    99 103   CO2 23 23 21   BUN 28* 35* 28*   CREATININE 1.9* 2.3* 2.0*   GLUCOSE 94 108* 112*     Hepatic: No results for
Nephrology Progress Note        [address], [address] [address]  Phone: [phone]  Office Hours: 8:30AM - 4:30PM  Monday - Friday 5/8/2023 7:31 AM  Subjective:   Admit Date: 5/3/2023  PCP: No primary care provider on file. Interval History: doing ok      Diet: ADULT DIET; Regular; Low Fat/Low Chol/High Fiber/2 gm Na  ADULT ORAL NUTRITION SUPPLEMENT; Breakfast, Dinner; Standard High Calorie/High Protein Oral Supplement      Data:   Scheduled Meds:   clopidogrel  75 mg Oral Daily    atorvastatin  80 mg Oral Nightly    valsartan  160 mg Oral Daily    amLODIPine  10 mg Oral Daily    fenofibrate  54 mg Oral Daily    metoprolol tartrate  25 mg Oral BID    mirtazapine  30 mg Oral Nightly    sertraline  150 mg Oral Daily    tamsulosin  0.4 mg Oral Daily    sodium chloride flush  5-40 mL IntraVENous 2 times per day    LORazepam  0.25 mg Oral 4x Daily    aspirin  81 mg Oral Daily     Continuous Infusions:   sodium chloride 75 mL/hr at 05/07/23 1323    sodium chloride       PRN Meds:sodium chloride flush, sodium chloride, ondansetron **OR** ondansetron, polyethylene glycol, acetaminophen **OR** acetaminophen  I/O last 3 completed shifts: In: 10 [I.V.:10]  Out: 1050 [Urine:1050]  No intake/output data recorded.     Intake/Output Summary (Last 24 hours) at 5/8/2023 0731  Last data filed at 5/7/2023 1531  Gross per 24 hour   Intake --   Output 475 ml   Net -475 ml       CBC:   Recent Labs     05/06/23  0603 05/07/23  0339   WBC 8.0 8.3   HGB 13.5 13.0*    221       BMP:    Recent Labs     05/06/23  0603 05/07/23  0339 05/08/23  0602    136 137   K 3.8 3.8 3.9    102 102   CO2 21 21 22   BUN 20 27* 26*   CREATININE 1.7* 2.0* 1.8*   GLUCOSE 115* 102* 105*         Objective:   Vitals: BP (!) 157/84   Pulse 63   Temp 98.5 °F (36.9 °C) (Oral)   Resp 20   Ht 5' 10\" (1.778 m)   Wt 151 lb 14.4 oz (68.9 kg)   SpO2 95%   BMI 21.80 kg/m²   General appearance:  in no acute
Okay to give all morning medications per Srinivasan Estrada, Cardiology.
Patient reported pain at IV site, upon assessment IV was inflamed. Provider notified and IV removed. Site cleaned and dressed. Patient declines insertion, provider notified.
Physical Therapy    Physical Therapy Treatment Note  Name: Karolina Guzmán MRN: [id number] :   1950   Date:  2023   Admission Date: 5/3/2023 Room:  [address] [place] [address]   Restrictions/Precautions:  fall risk; general precautions; contact; L sided CVA deficits  Communication with other providers:  RN handoff  Subjective:  Patient states: \"If they're as mean to me as they were at the last SNF I'm leaving\"  Pain:   Location, Type, Intensity (0/10 to 10/10):  denies  Objective:    Observation:  Supine, awake, alert, agreeable. He is agreeable to limited session ; participation improves w/ time on task. Tele, bed alarm in place  Treatment, including education/measures:  Therapeutic Activity Training:   Therapeutic activity training was instructed today. Cues were given for safety, sequence, UE/LE placement, awareness, and balance. Activities performed today included bed mobility training, sup-sit, sit-stand, SPT. Supine <-> sit: min A  Seated balance: good  STS: mod A for initiation to partial stand ; heavy R lean  Left EOB per pt request, call light in reach, gait belt for OOB, all needs met, bed alarmed    Therapeutic Exercise:  Cues were given for technique, safety, recruitment, and rationale. Cues were verbal and/or tactile. 2 x 10 LAQ negatives, hip abd, marches    Assessment / Impression:    Pt requires heavy assist for transfer and demonstrates inc difficulty w/ OOB this session. Strength, balance, functional mobility, safety awareness cont to impact. Will cont to follow.      Patient's tolerance of treatment:  fair +  Barriers to improvement:  comorbid conditions, safety awareness, deconditioning; old L sided CVA deficits  Plan for Next Session:    Cont w/ est DC plan (SNF)    Time in:  1508  Time out:  1535  Timed treatment minutes:  24  Total treatment time:  32    Previously filed items:  Social/Functional History  Lives With: Family (lives with daughter and granddaughters (8 and 15))  Type of Home:
Physical Therapy  [facility] ACUTE CARE PHYSICAL THERAPY EVALUATION  Tanisha Dia, 1950, 3119/3119-A, 5/11/2023    History  [place]:  The primary encounter diagnosis was Hypertensive urgency. Diagnoses of SHAUN (acute kidney injury) ([facility] [address] and Abnormal EKG were also pertinent to this visit. Patient  has a past medical history of Cerebral artery occlusion with cerebral infarction ([address] and Hypertension. Patient  has a past surgical history that includes Knee Arthroplasty. Assessment:  Pt presents 8 days s/p admission for elevated BP indicating hypertensive emergency. Pt is from home w/ dtr and grand children, they were recently evicted and he lost [de-identified] of his DME, reliant on assist for transportation and ambulation - he is w/c level at baseline, assist for home mgmt/some ADL's. Pt is primarily limited by strength and balance, additional deficits include functional mobility, safety awareness, endurance, coordination, ROM. Light assist t/o, unable to ambulate or self-propel w/c, reliant on assist and home situation in flux. Recommending SNF. Complexity: Moderate  Prognosis: Fair ; comorbid conditions, pain, safety awareness, social support  Plan 2-3+/week, 1 week  Equipment: pend to next LOC    Recommendations for NURSING mobility: SPT    Subjective:  Patient states:  \"I saw Landy Manuel in 2006, he was walking right down [address].\"    Pain:  denies.     Communication with other providers:  Handoff to RN, co-eval with FABRICIO Johnson  Restrictions: fall risk; general precautions; contact; L sided CVA deficits and UE contractures    Home Setup/Prior level of function  Social/Functional History  Lives With: Family (lives with daughter and granddaughters (8 and 15))  Type of Home: Homeless  Bathroom Shower/Tub: Tub/Shower unit  ADL Assistance: Needs assistance  Homemaking Assistance: Needs assistance  Ambulation Assistance: Non-ambulatory  Transfer Assistance: Independent (ind with transfers to , [facility], and
Transport set up with [employer] for [employer].
V2.0    [facility] Progress Note      Name:  Stefani Perales /Age/Sex: 1950  (72 y.o. male)   MRN & CSN:  [id number] & [id number] Encounter Date/Time: 2023 1:07 PM EDT   Location:  [facility] PCP: No primary care provider on file. Avelina Sihrley MD       Hospital Day: 10    Assessment and Recommendations   Stefani Perales is a 72 y.o. male with pmh of hypertension, BPH, previous stroke, and carotid endarterectomies from  [facility] who presents with Hypertensive urgency    Anticipate discharge tomorrow if creatinine continues to downtrend. Plan:  NSTEMI -  -Denied chest pain.   -Elevated troponin on admission which was trending up. -Given his high risk, Cardiology recommended Cardiac cath. patient refused when asked on , - started on Heparin drip at that point.   -Continue aspirin 81 mg daily, metoprolol 25 mg twice a day, valsartan 80 mg daily and Lipitor 80 mg daily  -Cont telemetry  -Patient had refused cardiac cath when initially offered however after extensively discussing with patient and daughter at bedside patient is now agreeable for PCI  Patient noted to have multivessel disease on cath , cardiothoracic surgery consulted but patient was deemed very high risk for CABG procedure. Will need staged PCI  Patient undergoing staged PCI, s/p left circumflex and OM PCI 5/10  further plan as per cardiology  F/u Cr, concern for MICHELLE due to high and repeated contrast load      Hypertensive urgency >> Resolved. Blood pressure on admission 230/99  [facility] not [facility] his blood pressure [facility] for last couple of weeks  S/p Cardene drip . Abnormal troponin. Up trended initially however third set improved. EKG nondiagnostic for ST elevation. Patient is chest pain-free. Continue oral antihypertensive medications. Echo EF 55 %.  Grade I diastolic (Clinically euvolemic)      Benign prostate hyperplasia  History of BPH  On Flomax, then took Flomax for last 2 weeks, awaiting
V2.0    [facility] Progress Note      Name:  Andrea Frederick /Age/Sex: 1950  (72 y.o. male)   MRN & CSN:  [id number] & [id number] Encounter Date/Time: 5/10/2023 1:07 PM EDT   Location:  [address] [place] [address] PCP: No primary care provider on file. Cynthia Esparza MD       Hospital Day: 8    Assessment and Recommendations   Andrea Frederick is a 72 y.o. male with pmh of hypertension, BPH, previous stroke, and carotid endarterectomies from  [facility] who presents with Hypertensive urgency      Plan:        NSTEMI -  -Denied chest pain.   -Elevated troponin on admission which was trending up. -Given his high risk, Cardiology recommended Cardiac cath. patient refused when asked on , - started on Heparin drip  -Continue aspirin 81 mg daily, metoprolol 25 mg twice a day, valsartan 80 mg daily and Lipitor 80 mg daily  -Cont telemetry  -Patient had refused cardiac cath when initially offered however after extensively discussing with patient and daughter at bedside patient is now agreeable for PCI  Patient noted to have multivessel disease on cath , cardiothoracic surgery consulted but patient was deemed very high risk for CABG procedure. Will need staged PCI  Further plan will be discussed at structural heart meeting on 5/10/2023  Plan for repeat heart catheterization today for staged PCI. NPO after midnight      Hypertensive urgency >> Resolved. Blood pressure on admission 230/99  [facility] and did not have his blood pressure medication for last couple of weeks  S/p Cardene drip . Abnormal initial troponin. Up trended initially however third set improved. EKG nondiagnostic for ST elevation. Patient is chest pain-free. Continue oral antihypertensive medications. Echo EF 55 %. Grade I diastolic (Clinically euvolemic)      Benign prostate hyperplasia  History of BPH  On Flomax, then took Flomax for last 2 weeks, awaiting from  [facility] to supply the home meds  Ordered Flomax    Acute kidney injury on ?  CKD >>
V2.0    [facility] Progress Note      Name:  Tova Barton /Age/Sex: 1950  (72 y.o. male)   MRN & CSN:  [id number] & [id number] Encounter Date/Time: 2023 1:07 PM EDT   Location:  [address] [place] [address] PCP: No primary care provider on file. Marci Ventura MD       Hospital Day: 6    Assessment and Recommendations   Tova Barton is a 72 y.o. male with pmh of hypertension, BPH, previous stroke, and carotid endarterectomies from [place] who presents with Hypertensive urgency      Plan:        NSTEMI -  -Denied chest pain.   -Elevated troponin on admission which was trending up. -Given his high risk, Cardiology recommended Cardiac cath. patient refused when asked on , - started on Heparin drip  -Continue aspirin 81 mg daily, metoprolol 25 mg twice a day, valsartan 80 mg daily and Lipitor 80 mg daily  -Cont telemetry   today after extensively discussing with patient and daughter at bedside patient is now agreeable for cardiac cath  Cardiology team updated. Patient plan for cardiac cath today . Hypertensive urgency >> Resolved. Blood pressure on admission 230/99  [facility] and did not have his blood pressure medication for last couple of weeks  S/p Cardene drip . Abnormal initial troponin. Up trended initially however third set improved. EKG nondiagnostic for ST elevation. Patient is chest pain-free. Continue oral antihypertensive medications. Echo EF 55 %. Grade I diastolic (Clinically euvolemic)      Benign prostate hyperplasia  History of BPH  On Flomax, then took Flomax for last 2 weeks, awaiting from [place] to supply the home meds  Ordered Flomax    Acute kidney injury on ?  CKD >> Resolved  Possibly urinary retention leading it to SHAUN  Avoid nephrotoxic agents  Urinalysis negative  Strict I & O  Cr stable    CVA-not issue at this time  Left arm hemiplegia/hemiparesis from previous stroke  Left leg hemiparesis from previous stroke  Carotid endarterectomy-not issue at this
disCussed with CT surgery pt  has history of stroke  Concerns about difficult recovery postsurgery and CABG  We will plan multivessel PCI staged for now continue aspirin and Plavix
168*     Hepatic: No results for input(s): AST, ALT, ALB, BILITOT, ALKPHOS in the last 72 hours. Troponin: No results for input(s): TROPONINI in the last 72 hours. BNP: No results for input(s): BNP in the last 72 hours. Lipids: No results for input(s): CHOL, HDL in the last 72 hours. Invalid input(s): LDLCALCU  ABGs:   Lab Results   Component Value Date/Time    PO2ART 80 05/08/2023 02:45 PM    SPR0TNC 37.0 05/08/2023 02:45 PM     INR: No results for input(s): INR in the last 72 hours.     Objective:   Vitals: BP (!) 129/56   Pulse 75   Temp 97.6 °F (36.4 °C) (Oral)   Resp 12   Ht 5' 10\" (1.778 m)   Wt 161 lb 11.2 oz (73.3 kg)   SpO2 93%   BMI 23.20 kg/m²   General appearance:  in no acute distress  HEENT: normocephalic, atraumatic,   Neck: supple, trachea midline  Lungs: cbreathing comfortably on room air  Heart[de-identified] regular rate and rhythm,   Abdomen: non distended  Extremities: extremities atraumatic, no cyanosis or edema      MEDICAL DECISION MAKING     -SHANU  -CKD4: cr 2, 1.9, 2, 1.5 as far back as 2013//renAL US showed no HN and there are bilateral renal cysts up to 2.5cm, right kidney smaller than left  -Hyponatremia  -Hypokalemia  -Metabolic acidosis  -Proteinuria of 6.6g on 24hr urine collection: hep panel negative//spep and upep showed no monoclonal gammopathy  -Hypertensive emergency  -BPH  -NSTEMI: s/p 2 [facility] with PCI on 5/10 and 5/12/, needs to stay on asa and plavix     Suggest:  -Cr stable at 2.1  -Continue valsartan for bp and proteinuria control  --Avoid nephrotoxins  If discharged , please follow up with Dr Bertha Bradley in 2-3 weeks with BMP and urine protein creat ratio                     Electronically signed by Michelle Tilley MD on 5/13/2023 at 9:02 AM    [facility]  MD Lucinda Torres DO [address]  [place], [place] [address]  PHONE: [phone]  FAX: [phone]
Ht 5' 10\" (1.778 m)   Wt 158 lb 4.8 oz (71.8 kg)   SpO2 96%   BMI 22.71 kg/m²   General appearance:  in no acute distress  HEENT: normocephalic, atraumatic,   Neck: supple, trachea midline  Lungs: breathing comfortably   Extremities: extremities atraumatic, no cyanosis or edema      MEDICAL DECISION MAKING     Patient Active Problem List    Diagnosis Date Noted    Hypertensive urgency 05/03/2023    CVA (cerebral vascular accident) ([facility] [address] 05/03/2023    Benign prostate hyperplasia 05/03/2023    Cerebral artery occlusion 05/03/2023     Cr 2.3, s/p another [facility] yesterday   Monitor BP  Will follow                  Electronically signed by Huang Keller DO on 5/11/2023 at 7:37 MD Cierra Loredo DO [address]  [place], [place] [address]  PHONE: [phone]  FAX: [phone]
agents  Urinalysis notable for significant proteinuria  Strict I & O  Cr stable  Nephrology consulted, SPEP and UPEP to evaluate proteinuria    CVA-not issue at this time  Left arm hemiplegia/hemiparesis from previous stroke  Left leg hemiparesis from previous stroke  Carotid endarterectomy-not issue at this time  Cerebral artery occlusion-nonissue at this time      Diet ADULT DIET; Regular; Low Sodium (2 gm)  ADULT ORAL NUTRITION SUPPLEMENT; Breakfast, Lunch, Dinner; Standard 4 oz Oral Supplement   DVT Prophylaxis [] Lovenox, []  Heparin, [] SCDs, [] Ambulation,  [] Eliquis, [] Xarelto  [] Coumadin   Code Status Full Code   Disposition Current Living situation: [place] with Daughter and her kids   Expected Disposition: [employer]  Estimated D/C: 1-2 days   Patient requires continued admission due to needing heparin drip. Cardiology clearance    Surrogate Decision Maker/ POA       Personally reviewed Lab Studies and Imaging           Subjective:     Chief Complaint: high BP    Sarahy Jorgensen is a 72 y.o. male who presents with evette BP during exam. No significant complain other than that     Seen and evaluated at bedside. No acute overnight events. I discussed patient's plan of care extensively. Patient agreeable for PCI/stents procedure. Denies any other active complaints. Review of Systems:      Pertinent positives and negatives discussed in HPI    Objective: Intake/Output Summary (Last 24 hours) at 5/9/2023 1246  Last data filed at 5/9/2023 0445  Gross per 24 hour   Intake 610 ml   Output 1300 ml   Net -690 ml        Vitals:   Vitals:    05/09/23 0445 05/09/23 0800 05/09/23 0900 05/09/23 0905   BP: (!) 178/77 (!) 149/63 136/63 136/63   Pulse: 66 64 68    Resp: 18      Temp: 97.6 °F (36.4 °C)      TempSrc: Oral      SpO2: 96%      Weight:       Height:             Physical Exam:      General: NAD  Eyes: EOMI  ENT: neck supple  Cardiovascular: Regular rate.   Respiratory: Clear to
rest    Body Systems and functions:  ROM: significantly limited ROM in LUE- contracted and hypertonic d/t hx CVA and hemiparesis. WFL RUE  Strength: 4-/5 in RUE, 2+/5 LUE  Sensation: WFL  Tone: hypertonic LUE, WFL RUE  Coordination: movements fluid and coordinated  Activity Tolerance: fair    Activities of Daily Living (ADLs):  Feeding: mod I  Grooming: min A seated  Toileting: max A  UB dressing/bathing: mod A  LB dressing/bathing: dep    *pt ADL function inferred from gross functional assessment of mobility, balance, posture, safety awareness, activity tolerance (unless otherwise indicated)    Cognitive and Psychosocial Functioning:  Overall cognitive status: A/Ox4, WNL. Poor safety awareness and impulse control. Flat sarcastic sense of humor. Several recent losses, wife in [address], lost house and all of his belongings few days ago, currently homeless. Affect: flat, pleasant    AM-PAC 6 click short form for inpatient daily activity:   How much help from another person does the patient currently need. .. Unable  Dep A Lot  Max A A Lot   Mod A A Little  Min A A Little   CGA  SBA None   Mod I  Indep  Sup   1. Putting on and taking off regular lower body clothing? [x] 1    [] 2   [] 2   [] 3   [] 3   [] 4      2. Bathing (including washing, rinsing, drying)? [] 1   [] 2   [x] 2 [] 3 [] 3 [] 4   3. Toileting, which includes using toilet, bedpan, or urinal? [] 1    [x] 2   [] 2   [] 3   [] 3   [] 4     4. Putting on and taking off regular upper body clothing? [] 1   [] 2   [x] 2   [] 3   [] 3    [] 4      5. Taking care of personal grooming such as brushing teeth? [] 1   [] 2    [] 2 [x] 3    [] 3   [] 4      6. Eating meals?    [] 1   [] 2   [] 2   [] 3    []3   [x] 4        Raw Score:  14      24/24 = unimpaired  23/24 = 1-20% impaired   20/24-22/24 = 21-40% impaired  15/24-19/24 = 41-59% impaired   10/24-14/24 = 60%-79% impaired  7/24-9/24 = 80%-99% impaired  6/24 = 100% impaired     Treatment:    Therapeutic Activity
 137 137   K 3.8 3.9 4.0    102 102   CO2 21 22 23   BUN 27* 26* 26*   CREATININE 2.0* 1.8* 1.8*     PT/INR: No results for input(s): PROTIME, INR in the last 72 hours. BNP:  No results for input(s): PROBNP in the last 72 hours. TROPONIN:   No results for input(s): TROPONINT in the last 72 hours. ECHO :   echocardiogram    Assessment:  72 y. o.year old who is admitted for  Chief Complaint   Patient presents with    Hypertension    , active issues as noted below:  Impression:  Principal Problem:    Hypertensive urgency  Active Problems:    CVA (cerebral vascular accident) ([facility] [address]    Benign prostate hyperplasia    Cerebral artery occlusion  Resolved Problems:    * No resolved hospital problems. *            All labs, medications and tests reviewed by myself , continue all other medications of all above medical condition listed as is except for changes mentioned above. Thank you very much for consult , please call with questions.     Pedro Washington MD, MD 5/9/2023 5:30 PM
Physical Exam:      General: NAD  Eyes: EOMI  ENT: neck supple  Cardiovascular: Regular rate. Respiratory: Clear to auscultation  Gastrointestinal: Soft, non tender  Genitourinary: no suprapubic tenderness  Musculoskeletal: No edema, right wrist wrapped in Ace and wrist splint post cardiac cath  Skin: warm, dry  Neuro: Alert. Psych: Mood appropriate. Medications:   Medications:    sodium chloride flush  5-40 mL IntraVENous 2 times per day    atorvastatin  80 mg Oral Nightly    aspirin  81 mg Oral Daily    clopidogrel  75 mg Oral Daily    [Held by provider] valsartan  160 mg Oral BID    gabapentin  300 mg Oral TID    amLODIPine  10 mg Oral Daily    fenofibrate  54 mg Oral Daily    metoprolol tartrate  25 mg Oral BID    mirtazapine  30 mg Oral Nightly    sertraline  150 mg Oral Daily    tamsulosin  0.4 mg Oral Daily    sodium chloride flush  5-40 mL IntraVENous 2 times per day    LORazepam  0.25 mg Oral 4x Daily      Infusions:    sodium chloride      sodium chloride      sodium chloride       PRN Meds: sodium chloride flush, 5-40 mL, PRN  sodium chloride, , PRN  acetaminophen, 650 mg, Q4H PRN  sodium chloride flush, 5-40 mL, PRN  sodium chloride, , PRN  ondansetron, 4 mg, Q8H PRN   Or  ondansetron, 4 mg, Q6H PRN  polyethylene glycol, 17 g, Daily PRN  acetaminophen, 650 mg, Q6H PRN        Labs and Imaging   XR CHEST PORTABLE    Result Date: 5/3/2023  EXAMINATION: ONE XRAY VIEW OF THE CHEST 5/3/2023 11:25 am COMPARISON: 04/13/2013 HISTORY: ORDERING SYSTEM PROVIDED HISTORY: hypertension TECHNOLOGIST PROVIDED HISTORY: Reason for exam:->hypertension Reason for Exam: hypertension Additional signs and symptoms: hypertension Relevant Medical/Surgical History: hypertension FINDINGS: The lungs are without acute focal process. There is no effusion or pneumothorax. The cardiomediastinal silhouette is stable. The osseous structures are stable. No acute process.        CBC:   Recent Labs     05/11/23  0253

## 2023-06-18 ENCOUNTER — APPOINTMENT (OUTPATIENT)
Dept: GENERAL RADIOLOGY | Age: 73
End: 2023-06-18
Payer: OTHER GOVERNMENT

## 2023-06-18 ENCOUNTER — HOSPITAL ENCOUNTER (EMERGENCY)
Age: 73
Discharge: HOME | End: 2023-06-18
Attending: EMERGENCY MEDICINE
Payer: OTHER GOVERNMENT

## 2023-06-18 VITALS
SYSTOLIC BLOOD PRESSURE: 152 MMHG | HEART RATE: 76 BPM | OXYGEN SATURATION: 98 % | RESPIRATION RATE: 18 BRPM | TEMPERATURE: 98.4 F | DIASTOLIC BLOOD PRESSURE: 75 MMHG

## 2023-06-18 DIAGNOSIS — M19.029 ARTHRITIS OF ELBOW: ICD-10-CM

## 2023-06-18 DIAGNOSIS — Z76.0 ENCOUNTER FOR MEDICATION REFILL: ICD-10-CM

## 2023-06-18 DIAGNOSIS — M25.521 RIGHT ELBOW PAIN: Primary | ICD-10-CM

## 2023-06-18 PROCEDURE — 6370000000 HC RX 637 (ALT 250 FOR IP): Performed by: EMERGENCY MEDICINE

## 2023-06-18 PROCEDURE — 73080 X-RAY EXAM OF ELBOW: CPT

## 2023-06-18 PROCEDURE — 99283 EMERGENCY DEPT VISIT LOW MDM: CPT

## 2023-06-18 RX ORDER — PREDNISONE 20 MG/1
40 TABLET ORAL DAILY
Status: DISCONTINUED | OUTPATIENT
Start: 2023-06-18 | End: 2023-06-18

## 2023-06-18 RX ORDER — ACETAMINOPHEN 500 MG
1000 TABLET ORAL ONCE
Status: COMPLETED | OUTPATIENT
Start: 2023-06-18 | End: 2023-06-18

## 2023-06-18 RX ORDER — ATORVASTATIN CALCIUM 80 MG/1
80 TABLET, FILM COATED ORAL DAILY
Qty: 14 TABLET | Refills: 0 | Status: SHIPPED | OUTPATIENT
Start: 2023-06-18

## 2023-06-18 RX ORDER — CLOPIDOGREL BISULFATE 75 MG/1
75 TABLET ORAL DAILY
Qty: 14 TABLET | Refills: 0 | Status: SHIPPED | OUTPATIENT
Start: 2023-06-18

## 2023-06-18 RX ORDER — PREDNISONE 20 MG/1
40 TABLET ORAL DAILY
Qty: 10 TABLET | Refills: 0 | Status: SHIPPED | OUTPATIENT
Start: 2023-06-18 | End: 2023-06-23

## 2023-06-18 RX ORDER — VALSARTAN 80 MG/1
80 TABLET ORAL DAILY
Qty: 14 TABLET | Refills: 0 | Status: SHIPPED | OUTPATIENT
Start: 2023-06-18

## 2023-06-18 RX ORDER — PREDNISONE 20 MG/1
40 TABLET ORAL ONCE
Status: COMPLETED | OUTPATIENT
Start: 2023-06-18 | End: 2023-06-18

## 2023-06-18 RX ADMIN — ACETAMINOPHEN 1000 MG: 500 TABLET ORAL at 02:46

## 2023-06-18 RX ADMIN — PREDNISONE 40 MG: 20 TABLET ORAL at 03:40

## 2023-06-18 ASSESSMENT — PAIN DESCRIPTION - LOCATION
LOCATION: ELBOW
LOCATION: ELBOW

## 2023-06-18 ASSESSMENT — PAIN SCALES - GENERAL
PAINLEVEL_OUTOF10: 10
PAINLEVEL_OUTOF10: 3

## 2023-06-18 ASSESSMENT — PAIN - FUNCTIONAL ASSESSMENT: PAIN_FUNCTIONAL_ASSESSMENT: 0-10

## 2023-06-18 ASSESSMENT — PAIN DESCRIPTION - ORIENTATION: ORIENTATION: RIGHT

## 2023-06-18 ASSESSMENT — PAIN DESCRIPTION - DESCRIPTORS: DESCRIPTORS: ACHING

## 2023-07-04 ENCOUNTER — HOSPITAL ENCOUNTER (EMERGENCY)
Age: 73
Discharge: HOME | End: 2023-07-04
Attending: EMERGENCY MEDICINE
Payer: OTHER GOVERNMENT

## 2023-07-04 VITALS
DIASTOLIC BLOOD PRESSURE: 93 MMHG | TEMPERATURE: 97.8 F | SYSTOLIC BLOOD PRESSURE: 189 MMHG | WEIGHT: 173 LBS | BODY MASS INDEX: 24.82 KG/M2 | HEART RATE: 60 BPM | RESPIRATION RATE: 18 BRPM | OXYGEN SATURATION: 96 %

## 2023-07-04 DIAGNOSIS — M25.512 CHRONIC LEFT SHOULDER PAIN: Primary | ICD-10-CM

## 2023-07-04 DIAGNOSIS — G89.29 CHRONIC LEFT SHOULDER PAIN: Primary | ICD-10-CM

## 2023-07-04 LAB
EKG ATRIAL RATE: 72 BPM
EKG DIAGNOSIS: NORMAL
EKG P AXIS: 41 DEGREES
EKG P-R INTERVAL: 182 MS
EKG Q-T INTERVAL: 450 MS
EKG QRS DURATION: 98 MS
EKG QTC CALCULATION (BAZETT): 492 MS
EKG R AXIS: 11 DEGREES
EKG T AXIS: -10 DEGREES
EKG VENTRICULAR RATE: 72 BPM

## 2023-07-04 PROCEDURE — 6370000000 HC RX 637 (ALT 250 FOR IP): Performed by: EMERGENCY MEDICINE

## 2023-07-04 PROCEDURE — 93010 ELECTROCARDIOGRAM REPORT: CPT | Performed by: INTERNAL MEDICINE

## 2023-07-04 PROCEDURE — 99283 EMERGENCY DEPT VISIT LOW MDM: CPT

## 2023-07-04 PROCEDURE — 93005 ELECTROCARDIOGRAM TRACING: CPT | Performed by: EMERGENCY MEDICINE

## 2023-07-04 RX ORDER — LORAZEPAM 1 MG/1
1 TABLET ORAL ONCE
Status: COMPLETED | OUTPATIENT
Start: 2023-07-04 | End: 2023-07-04

## 2023-07-04 RX ORDER — METOPROLOL TARTRATE 50 MG/1
50 TABLET, FILM COATED ORAL 2 TIMES DAILY
Status: DISCONTINUED | OUTPATIENT
Start: 2023-07-04 | End: 2023-07-04 | Stop reason: HOSPADM

## 2023-07-04 RX ORDER — VALSARTAN 40 MG/1
80 TABLET ORAL DAILY
Status: DISCONTINUED | OUTPATIENT
Start: 2023-07-04 | End: 2023-07-04 | Stop reason: HOSPADM

## 2023-07-04 RX ORDER — GABAPENTIN 400 MG/1
800 CAPSULE ORAL ONCE
Status: COMPLETED | OUTPATIENT
Start: 2023-07-04 | End: 2023-07-04

## 2023-07-04 RX ORDER — AMLODIPINE BESYLATE 5 MG/1
10 TABLET ORAL ONCE
Status: COMPLETED | OUTPATIENT
Start: 2023-07-04 | End: 2023-07-04

## 2023-07-04 RX ADMIN — LORAZEPAM 1 MG: 1 TABLET ORAL at 12:23

## 2023-07-04 RX ADMIN — GABAPENTIN 800 MG: 400 CAPSULE ORAL at 12:50

## 2023-07-04 RX ADMIN — VALSARTAN 80 MG: 40 TABLET ORAL at 12:46

## 2023-07-04 RX ADMIN — AMLODIPINE BESYLATE 10 MG: 5 TABLET ORAL at 12:23

## 2023-07-04 RX ADMIN — SERTRALINE HYDROCHLORIDE 150 MG: 50 TABLET, FILM COATED ORAL at 12:46

## 2023-07-04 RX ADMIN — METOPROLOL TARTRATE 50 MG: 50 TABLET, FILM COATED ORAL at 12:23

## 2023-07-04 ASSESSMENT — PAIN DESCRIPTION - ORIENTATION: ORIENTATION: LEFT

## 2023-07-04 ASSESSMENT — PAIN DESCRIPTION - LOCATION: LOCATION: SHOULDER

## 2023-07-04 ASSESSMENT — PAIN SCALES - GENERAL: PAINLEVEL_OUTOF10: 8

## 2023-07-04 NOTE — ED NOTES
Patient discharged to home at this time. Discharge instructions and follow up care discussed, patient voices understanding.        Eva House, RN  07/04/23 4312

## 2023-07-04 NOTE — ED PROVIDER NOTES
Emergency Department Encounter  Location: [facility] EMERGENCY DEPARTMENT    Patient: Jing Neves  MRN: [id number]  : 1950  Date of evaluation: 2023  ED Provider: Srini Alarcon DO    Chief Complaint:    Shoulder Pain (Left shoulder pain, states every morning but worse today)    [place]:  Jing Neves is a 72 y.o. male that presents to the emergency department with concern for left shoulder pain. Patient is unable to use his left arm after prior CVA. He describes that his arm is always contractured but sometimes \"pulls up\" and is painful. He typically uses gabapentin to control the symptoms but ran out of this medication as well as all of his other medications 3 days ago. He reports that he woke up with the pain and has been unable to straighten his arm out to a comfortable position since this morning. No chest pain or shortness of breath. No fever or chills. No recent trauma to the area. Past Medical History:   Diagnosis Date    Cerebral artery occlusion with cerebral infarction [facility])     H/O percutaneous left heart catheterization 2023    Multivessel CAD    Hypertension      Past Surgical History:   Procedure Laterality Date    KNEE ARTHROPLASTY       History reviewed. No pertinent family history.   Social History     Socioeconomic History    Marital status: Legally      Spouse name: Not on file    Number of children: Not on file    Years of education: Not on file    Highest education level: Not on file   Occupational History    Not on file   Tobacco Use    Smoking status: Every Day     Packs/day: 1.50     Types: Cigarettes    Smokeless tobacco: Not on file   Substance and Sexual Activity    Alcohol use: Not Currently     Comment: occasional    Drug use: No    Sexual activity: Not on file   Other Topics Concern    Not on file   Social History Narrative    Not on file     Social Determinants of Health     Financial Resource Strain: Not on

## 2023-08-11 ENCOUNTER — HOSPITAL ENCOUNTER (EMERGENCY)
Age: 73
Discharge: HOME | End: 2023-08-11
Payer: OTHER GOVERNMENT

## 2023-08-11 ENCOUNTER — APPOINTMENT (OUTPATIENT)
Dept: GENERAL RADIOLOGY | Age: 73
End: 2023-08-11
Payer: OTHER GOVERNMENT

## 2023-08-11 VITALS
TEMPERATURE: 97.2 F | WEIGHT: 175 LBS | DIASTOLIC BLOOD PRESSURE: 71 MMHG | RESPIRATION RATE: 11 BRPM | HEART RATE: 66 BPM | SYSTOLIC BLOOD PRESSURE: 170 MMHG | OXYGEN SATURATION: 96 % | BODY MASS INDEX: 25.11 KG/M2

## 2023-08-11 DIAGNOSIS — R33.8 ACUTE URINARY RETENTION: ICD-10-CM

## 2023-08-11 DIAGNOSIS — I10 UNCONTROLLED HYPERTENSION: Primary | ICD-10-CM

## 2023-08-11 LAB
ALBUMIN SERPL-MCNC: 3.9 GM/DL (ref 3.4–5)
ALP BLD-CCNC: 130 IU/L (ref 40–128)
ALT SERPL-CCNC: 13 U/L (ref 10–40)
ANION GAP SERPL CALCULATED.3IONS-SCNC: 16 MMOL/L (ref 4–16)
AST SERPL-CCNC: 20 IU/L (ref 15–37)
BACTERIA: NEGATIVE /HPF
BASOPHILS ABSOLUTE: 0.1 K/CU MM
BASOPHILS RELATIVE PERCENT: 1.1 % (ref 0–1)
BILIRUB SERPL-MCNC: 0.3 MG/DL (ref 0–1)
BILIRUBIN URINE: NEGATIVE MG/DL
BLOOD, URINE: ABNORMAL
BUN SERPL-MCNC: 23 MG/DL (ref 6–23)
CALCIUM SERPL-MCNC: 9.2 MG/DL (ref 8.3–10.6)
CHLORIDE BLD-SCNC: 106 MMOL/L (ref 99–110)
CLARITY: CLEAR
CO2: 17 MMOL/L (ref 21–32)
COLOR: YELLOW
CREAT SERPL-MCNC: 1.9 MG/DL (ref 0.9–1.3)
DIFFERENTIAL TYPE: ABNORMAL
EKG ATRIAL RATE: 57 BPM
EKG DIAGNOSIS: NORMAL
EKG P AXIS: 55 DEGREES
EKG P-R INTERVAL: 224 MS
EKG Q-T INTERVAL: 484 MS
EKG QRS DURATION: 104 MS
EKG QTC CALCULATION (BAZETT): 471 MS
EKG R AXIS: 14 DEGREES
EKG T AXIS: -31 DEGREES
EKG VENTRICULAR RATE: 57 BPM
EOSINOPHILS ABSOLUTE: 0.4 K/CU MM
EOSINOPHILS RELATIVE PERCENT: 3.3 % (ref 0–3)
GFR SERPL CREATININE-BSD FRML MDRD: 37 ML/MIN/1.73M2
GLUCOSE SERPL-MCNC: 112 MG/DL (ref 70–99)
GLUCOSE, URINE: NEGATIVE MG/DL
HCT VFR BLD CALC: 47.7 % (ref 42–52)
HEMOGLOBIN: 14.1 GM/DL (ref 13.5–18)
IMMATURE NEUTROPHIL %: 0.7 % (ref 0–0.43)
KETONES, URINE: NEGATIVE MG/DL
LEUKOCYTE ESTERASE, URINE: NEGATIVE
LYMPHOCYTES ABSOLUTE: 1.7 K/CU MM
LYMPHOCYTES RELATIVE PERCENT: 16.1 % (ref 24–44)
MCH RBC QN AUTO: 28.8 PG (ref 27–31)
MCHC RBC AUTO-ENTMCNC: 29.6 % (ref 32–36)
MCV RBC AUTO: 97.5 FL (ref 78–100)
MONOCYTES ABSOLUTE: 0.8 K/CU MM
MONOCYTES RELATIVE PERCENT: 7.8 % (ref 0–4)
NITRITE URINE, QUANTITATIVE: NEGATIVE
NUCLEATED RBC %: 0 %
PDW BLD-RTO: 14.6 % (ref 11.7–14.9)
PH, URINE: 6 (ref 5–8)
PLATELET # BLD: 272 K/CU MM (ref 140–440)
PMV BLD AUTO: 10.1 FL (ref 7.5–11.1)
POTASSIUM SERPL-SCNC: 4 MMOL/L (ref 3.5–5.1)
PROTEIN UA: >300 MG/DL
RBC # BLD: 4.89 M/CU MM (ref 4.6–6.2)
RBC URINE: 1 /HPF (ref 0–3)
SEGMENTED NEUTROPHILS ABSOLUTE COUNT: 7.6 K/CU MM
SEGMENTED NEUTROPHILS RELATIVE PERCENT: 71 % (ref 36–66)
SODIUM BLD-SCNC: 139 MMOL/L (ref 135–145)
SPECIFIC GRAVITY UA: 1.02 (ref 1–1.03)
TOTAL IMMATURE NEUTOROPHIL: 0.07 K/CU MM
TOTAL NUCLEATED RBC: 0 K/CU MM
TOTAL PROTEIN: 7.6 GM/DL (ref 6.4–8.2)
TRICHOMONAS: NORMAL /HPF
TROPONIN T: 0.05 NG/ML
TROPONIN T: 0.05 NG/ML
UROBILINOGEN, URINE: 0.2 MG/DL (ref 0.2–1)
WBC # BLD: 10.8 K/CU MM (ref 4–10.5)
WBC UA: <1 /HPF (ref 0–2)

## 2023-08-11 PROCEDURE — 51798 US URINE CAPACITY MEASURE: CPT

## 2023-08-11 PROCEDURE — 85025 COMPLETE CBC W/AUTO DIFF WBC: CPT

## 2023-08-11 PROCEDURE — 80053 COMPREHEN METABOLIC PANEL: CPT

## 2023-08-11 PROCEDURE — 93010 ELECTROCARDIOGRAM REPORT: CPT | Performed by: INTERNAL MEDICINE

## 2023-08-11 PROCEDURE — 84484 ASSAY OF TROPONIN QUANT: CPT

## 2023-08-11 PROCEDURE — 71045 X-RAY EXAM CHEST 1 VIEW: CPT

## 2023-08-11 PROCEDURE — 93005 ELECTROCARDIOGRAM TRACING: CPT | Performed by: PHYSICIAN ASSISTANT

## 2023-08-11 PROCEDURE — 81001 URINALYSIS AUTO W/SCOPE: CPT

## 2023-08-11 PROCEDURE — 6370000000 HC RX 637 (ALT 250 FOR IP): Performed by: PHYSICIAN ASSISTANT

## 2023-08-11 PROCEDURE — 6370000000 HC RX 637 (ALT 250 FOR IP): Performed by: NURSE PRACTITIONER

## 2023-08-11 PROCEDURE — 99285 EMERGENCY DEPT VISIT HI MDM: CPT

## 2023-08-11 RX ORDER — AMLODIPINE BESYLATE 10 MG/1
10 TABLET ORAL DAILY
Qty: 7 TABLET | Refills: 0 | Status: SHIPPED | OUTPATIENT
Start: 2023-08-11 | End: 2023-08-18

## 2023-08-11 RX ORDER — TAMSULOSIN HYDROCHLORIDE 0.4 MG/1
0.8 CAPSULE ORAL DAILY
Qty: 14 CAPSULE | Refills: 0 | Status: SHIPPED | OUTPATIENT
Start: 2023-08-11 | End: 2023-08-11 | Stop reason: SDUPTHER

## 2023-08-11 RX ORDER — VALSARTAN 40 MG/1
80 TABLET ORAL DAILY
Status: DISCONTINUED | OUTPATIENT
Start: 2023-08-11 | End: 2023-08-12 | Stop reason: HOSPADM

## 2023-08-11 RX ORDER — AMLODIPINE BESYLATE 10 MG/1
10 TABLET ORAL DAILY
Qty: 7 TABLET | Refills: 0 | Status: SHIPPED | OUTPATIENT
Start: 2023-08-11 | End: 2023-08-11 | Stop reason: SDUPTHER

## 2023-08-11 RX ORDER — LIDOCAINE HYDROCHLORIDE 20 MG/ML
JELLY TOPICAL PRN
Status: DISCONTINUED | OUTPATIENT
Start: 2023-08-11 | End: 2023-08-12 | Stop reason: HOSPADM

## 2023-08-11 RX ORDER — VALSARTAN 80 MG/1
80 TABLET ORAL DAILY
Qty: 30 TABLET | Refills: 1 | Status: SHIPPED | OUTPATIENT
Start: 2023-08-11 | End: 2023-08-11 | Stop reason: SDUPTHER

## 2023-08-11 RX ORDER — VALSARTAN 80 MG/1
80 TABLET ORAL DAILY
Qty: 7 TABLET | Refills: 0 | Status: SHIPPED | OUTPATIENT
Start: 2023-08-11 | End: 2023-08-18

## 2023-08-11 RX ORDER — VALSARTAN 80 MG/1
80 TABLET ORAL DAILY
Qty: 7 TABLET | Refills: 0 | Status: SHIPPED | OUTPATIENT
Start: 2023-08-11 | End: 2023-08-11 | Stop reason: SDUPTHER

## 2023-08-11 RX ORDER — AMLODIPINE BESYLATE 10 MG/1
10 TABLET ORAL DAILY
Qty: 30 TABLET | Refills: 1 | Status: SHIPPED | OUTPATIENT
Start: 2023-08-11 | End: 2023-08-11 | Stop reason: SDUPTHER

## 2023-08-11 RX ORDER — TAMSULOSIN HYDROCHLORIDE 0.4 MG/1
0.8 CAPSULE ORAL DAILY
Qty: 14 CAPSULE | Refills: 0 | Status: SHIPPED | OUTPATIENT
Start: 2023-08-11 | End: 2023-08-18

## 2023-08-11 RX ORDER — METOPROLOL TARTRATE 50 MG/1
25 TABLET, FILM COATED ORAL ONCE
Status: COMPLETED | OUTPATIENT
Start: 2023-08-11 | End: 2023-08-11

## 2023-08-11 RX ORDER — ACETAMINOPHEN 325 MG/1
650 TABLET ORAL ONCE
Status: COMPLETED | OUTPATIENT
Start: 2023-08-11 | End: 2023-08-11

## 2023-08-11 RX ORDER — TAMSULOSIN HYDROCHLORIDE 0.4 MG/1
0.8 CAPSULE ORAL ONCE
Status: COMPLETED | OUTPATIENT
Start: 2023-08-11 | End: 2023-08-11

## 2023-08-11 RX ORDER — AMLODIPINE BESYLATE 5 MG/1
10 TABLET ORAL ONCE
Status: COMPLETED | OUTPATIENT
Start: 2023-08-11 | End: 2023-08-11

## 2023-08-11 RX ORDER — TAMSULOSIN HYDROCHLORIDE 0.4 MG/1
0.4 CAPSULE ORAL DAILY
Qty: 30 CAPSULE | Refills: 0 | Status: SHIPPED | OUTPATIENT
Start: 2023-08-11 | End: 2023-08-11 | Stop reason: SDUPTHER

## 2023-08-11 RX ADMIN — ACETAMINOPHEN 650 MG: 325 TABLET ORAL at 13:51

## 2023-08-11 RX ADMIN — AMLODIPINE BESYLATE 10 MG: 5 TABLET ORAL at 13:51

## 2023-08-11 RX ADMIN — METOPROLOL TARTRATE 25 MG: 50 TABLET, FILM COATED ORAL at 13:51

## 2023-08-11 RX ADMIN — LIDOCAINE HYDROCHLORIDE: 20 JELLY TOPICAL at 21:14

## 2023-08-11 RX ADMIN — VALSARTAN 80 MG: 40 TABLET ORAL at 15:46

## 2023-08-11 RX ADMIN — TAMSULOSIN HYDROCHLORIDE 0.8 MG: 0.4 CAPSULE ORAL at 20:40

## 2023-08-11 ASSESSMENT — PAIN SCALES - GENERAL: PAINLEVEL_OUTOF10: 0

## 2023-08-11 ASSESSMENT — PAIN - FUNCTIONAL ASSESSMENT: PAIN_FUNCTIONAL_ASSESSMENT: 0-10

## 2023-08-11 NOTE — ED PROVIDER NOTES
EMERGENCY DEPARTMENT ENCOUNTER        Pt Name: Karine Sheffield  MRN: [id number]  [address] [facility] 1950  Date of evaluation: 8/11/2023  Provider: JO ANN Hou  PCP: No primary care provider on file. ZANE. I have evaluated this patient. Triage CHIEF COMPLAINT       Chief Complaint   Patient presents with    Hypertension    Medication Refill         HISTORY OF PRESENT ILLNESS      Chief Complaint: Hypertension, headache, decreased urinary output    Karine Sheffield is a 72 y.o.  male who presents emerged from today via EMS with concern for his blood pressure reading. He states that he felt helpless and evaluated today. Elevation of blood pressure, he states his systolic pressures over 727 diastolic of 311. Significant allergies medications, Diovan, prolonged, Norvasc last several weeks. He states he is mild headache presents elevated blood pressure. No other significant family history patient not having significant urinary output. He denies chest pain shortness of breath exertion, pleuritic pain, abdominal pain. No fevers or chills,  Patient does have a history of left-sided, consistent with previous her. Nursing Notes were all reviewed and agreed with or any disagreements were addressed in the HPI. REVIEW OF SYSTEMS     At least 10 systems reviewed and otherwise acutely negative except as in the [address]      PAST MEDICAL HISTORY     Past Medical History:   Diagnosis Date    Cerebral artery occlusion with cerebral infarction [facility])     H/O percutaneous left heart catheterization 05/08/2023    Multivessel CAD    Hypertension        SURGICAL HISTORY     Past Surgical History:   Procedure Laterality Date    KNEE ARTHROPLASTY         CURRENTMEDICATIONS       Current Discharge Medication List        CONTINUE these medications which have NOT CHANGED    Details   !! clopidogrel (PLAVIX) 75 MG tablet Take 1 tablet by mouth daily  Qty: 14 tablet, Refills: 0      !! atorvastatin (LIPITOR) 80 MG tablet medications for several weeks, provided Norvasc metoprolol. He is initial pressures 200s over 100 at home, here in the emergency room his blood pressure 200/90. He admits to mild headache, he states he has been urinating adequately either the seem to be ongoing issues. Obtain screening EKG, lab work and urinalysis will obtain bladder scan. Patient was given his oral medications, cardiac, overall, Norvasc and will monitor his blood pressure. EKG nonischemic, dynamic changes. Troponin 0.053 is always elevated. Likely due to his elevated creatinine. Low suspicion for ACS. Patient blood pressure improved greatly with his oral meds. He is down to 130/95 after initial dose of medication. Will await bladder scan, urinalysis, refill recent medications, illnesses vital signs stable upon discharge with outpatient follow-up. History from : Patient    Limitations to history : None    Patient was given the following medications:  Medications   valsartan (DIOVAN) tablet 80 mg (has no administration in time range)   metoprolol tartrate (LOPRESSOR) tablet 25 mg (25 mg Oral Given 8/11/23 1351)   amLODIPine (NORVASC) tablet 10 mg (10 mg Oral Given 8/11/23 1351)   acetaminophen (TYLENOL) tablet 650 mg (650 mg Oral Given 8/11/23 1351)       Independent Imaging Interpretation by me: Chest x-ray cardiomegaly. No signs of acute cardiopulmonary process. EKG (if obtained): See supervising physician note for EKG interpretation. Chronic conditions affecting care: Chronic kidney disease, controlled hypertension history of CVA      Social Determinants : None    Records Reviewed : Source reviewed EMR, patient had recent mission activity pregnancy, isolation for similar, without medications, restraints. I am the Primary Clinician of Record.       ________________________________________________________________________     I have signed out Shawanda Guido[facility] Emergency Department care to MARTHA Oscar.    Bedside hand-off

## 2023-08-11 NOTE — ED TRIAGE NOTES
Pt presents for Hypertension and medication refill for BP meds. Pt has hx of CVA in 2016, does not ambulate, L sided deficits.

## 2023-08-11 NOTE — ED NOTES
Report received from [facility], care assumed at this time.       Isamar Lilly RN  08/11/23 1926

## 2023-08-11 NOTE — ED NOTES
ED TO INPATIENT SBAR HANDOFF    Patient Name: Shelley Sotelo   :  1950  72 y.o. Preferred Name  Sadi Casillas  Family/Caregiver Present no   Restraints no   C-SSRS: Risk of Suicide: No Risk  Sitter no   Sepsis Risk Score Sepsis Risk Score: 0.82      Situation  Chief Complaint   Patient presents with    Hypertension    Medication Refill     Brief Description of Patient's Condition: Patient to the ED for hypertension. Patient did receive home BP meds here in ED which helped BP for a short period then returned to 200s. Patient also having urinary retention and to have cath placed in ED  Mental Status: oriented, alert, coherent, logical, thought processes intact, and able to concentrate and follow conversation  Arrived from: home    Imaging:   XR CHEST PORTABLE   Final Result   1. No active pulmonary disease.            Abnormal labs:   Abnormal Labs Reviewed   CBC WITH AUTO DIFFERENTIAL - Abnormal; Notable for the following components:       Result Value    WBC 10.8 (*)     MCHC 29.6 (*)     Segs Relative 71.0 (*)     Lymphocytes % 16.1 (*)     Monocytes % 7.8 (*)     Eosinophils % 3.3 (*)     Basophils % 1.1 (*)     Immature Neutrophil % 0.7 (*)     All other components within normal limits   COMPREHENSIVE METABOLIC PANEL - Abnormal; Notable for the following components:    CO2 17 (*)     Creatinine 1.9 (*)     Est, Glom Filt Rate 37 (*)     Glucose 112 (*)     Alkaline Phosphatase 130 (*)     All other components within normal limits   URINALYSIS - Abnormal; Notable for the following components:    Blood, Urine TRACE (*)     Protein, UA >300 (*)     All other components within normal limits   TROPONIN - Abnormal; Notable for the following components:    Troponin T 0.053 (*)     All other components within normal limits       Background  History:   Past Medical History:   Diagnosis Date    Cerebral artery occlusion with cerebral infarction [facility])     H/O percutaneous left heart catheterization 2023    Multivessel

## 2023-08-12 ENCOUNTER — HOSPITAL ENCOUNTER (EMERGENCY)
Age: 73
Discharge: HOME | End: 2023-08-12
Attending: EMERGENCY MEDICINE
Payer: OTHER GOVERNMENT

## 2023-08-12 VITALS
DIASTOLIC BLOOD PRESSURE: 79 MMHG | SYSTOLIC BLOOD PRESSURE: 163 MMHG | OXYGEN SATURATION: 98 % | RESPIRATION RATE: 20 BRPM | BODY MASS INDEX: 25.05 KG/M2 | WEIGHT: 175 LBS | TEMPERATURE: 98.3 F | HEIGHT: 70 IN

## 2023-08-12 DIAGNOSIS — Z76.0 ENCOUNTER FOR MEDICATION REFILL: Primary | ICD-10-CM

## 2023-08-12 PROCEDURE — 99283 EMERGENCY DEPT VISIT LOW MDM: CPT

## 2023-08-12 PROCEDURE — 6370000000 HC RX 637 (ALT 250 FOR IP): Performed by: EMERGENCY MEDICINE

## 2023-08-12 RX ORDER — METOPROLOL TARTRATE 50 MG/1
25 TABLET, FILM COATED ORAL ONCE
Status: COMPLETED | OUTPATIENT
Start: 2023-08-12 | End: 2023-08-12

## 2023-08-12 RX ORDER — TAMSULOSIN HYDROCHLORIDE 0.4 MG/1
0.4 CAPSULE ORAL ONCE
Status: COMPLETED | OUTPATIENT
Start: 2023-08-12 | End: 2023-08-12

## 2023-08-12 RX ORDER — VALSARTAN 40 MG/1
80 TABLET ORAL ONCE
Status: COMPLETED | OUTPATIENT
Start: 2023-08-12 | End: 2023-08-12

## 2023-08-12 RX ORDER — AMLODIPINE BESYLATE 5 MG/1
10 TABLET ORAL ONCE
Status: COMPLETED | OUTPATIENT
Start: 2023-08-12 | End: 2023-08-12

## 2023-08-12 RX ADMIN — METOPROLOL TARTRATE 25 MG: 50 TABLET, FILM COATED ORAL at 21:37

## 2023-08-12 RX ADMIN — TAMSULOSIN HYDROCHLORIDE 0.4 MG: 0.4 CAPSULE ORAL at 21:38

## 2023-08-12 RX ADMIN — AMLODIPINE BESYLATE 10 MG: 5 TABLET ORAL at 21:37

## 2023-08-12 RX ADMIN — VALSARTAN 80 MG: 40 TABLET, COATED ORAL at 21:37

## 2023-08-12 ASSESSMENT — PAIN - FUNCTIONAL ASSESSMENT: PAIN_FUNCTIONAL_ASSESSMENT: NONE - DENIES PAIN

## 2023-08-12 ASSESSMENT — LIFESTYLE VARIABLES
HOW MANY STANDARD DRINKS CONTAINING ALCOHOL DO YOU HAVE ON A TYPICAL DAY: PATIENT DOES NOT DRINK
HOW OFTEN DO YOU HAVE A DRINK CONTAINING ALCOHOL: NEVER

## 2023-08-12 NOTE — ED NOTES
Patients daughter called back asking to speak to provider again. Daughter informed that the provider has gone home for the evening and asked to speak to the nurse. Patient told this nurse \"It is just crazy they will send him home with one. \" Patients daughter educated on how to care for catheter and to call urology on Monday to schedule an appointment. Patients daughter agitated that the patient is still going to go home with a catheter and told this nurse \"Well you will be seeing him again this weekend. You're doing this to me so I am going to do it to you. \"     Julian Franks RN  08/11/23 4423

## 2023-08-12 NOTE — DISCHARGE INSTRUCTIONS
Call your primary care physician on Monday for further evaluation of your acute urinary retention and elevated blood pressure.   You will need to be seen by urology in the next week for evaluation of Huerta removal.

## 2023-08-12 NOTE — ED NOTES
Spoke to patients daughter about patient being discharged. Patients daughter upset that patient is being sent home with a catheter and asked to speak to provider. Call transferred to [facility] MARTHA.       Tamara Larson RN  08/11/23 3947

## 2023-08-13 NOTE — ED PROVIDER NOTES
Emergency Department Encounter  Location: [facility] EMERGENCY DEPARTMENT    Patient: Omar Jackson  MRN: [id number]  : 1950  Date of evaluation: 2023  ED Provider: Karlos Morrell DO    Chief Complaint:    Urinary Catheter (Wants catheter out. )    [place]:  Omar Jackson is a 72 y.o. male that presents to the emergency department with initial request for urinary catheter to be removed. This was apparently placed yesterday by urology after he was found to have significant retention. Urology did recommend this remain in place for 1 week. Patient reports that he is unable to eat because the catheter is uncomfortable when he sits forward. No abdominal pain, fever, chills or vomiting. States he does not know how to take care of the catheter. He is also concerned his blood pressure is elevated, and he does not have his medications. No reported chest pain or dyspnea. Past Medical History:   Diagnosis Date    Cerebral artery occlusion with cerebral infarction [facility])     H/O percutaneous left heart catheterization 2023    Multivessel CAD    Hypertension      Past Surgical History:   Procedure Laterality Date    KNEE ARTHROPLASTY       No family history on file.   Social History     Socioeconomic History    Marital status: Legally      Spouse name: Not on file    Number of children: Not on file    Years of education: Not on file    Highest education level: Not on file   Occupational History    Not on file   Tobacco Use    Smoking status: Every Day     Packs/day: 1.50     Types: Cigarettes    Smokeless tobacco: Not on file   Substance and Sexual Activity    Alcohol use: Not Currently     Comment: occasional    Drug use: No    Sexual activity: Not on file   Other Topics Concern    Not on file   Social History Narrative    Not on file     Social Determinants of Health     Financial Resource Strain: Not on file   Food Insecurity: Not on file   Transportation

## 2023-08-13 NOTE — DISCHARGE INSTRUCTIONS
Call [address] office for ride to [place]  [phone]. Local [facility] office for questions and concerns [phone].

## 2023-08-13 NOTE — ED NOTES
Spoke with patients daughter. Daughter is agitated because patient has no blood pressure medication. Explained to daughter we were sending him home with a prescription and he can fill it at any pharmacy and we were giving him a dose before discharge. Daughter states she wants him admitted. Explained to daughter that patient has no reason for admission. Daughter states \"well then he's just going to keep coming back there. \"      Madeleine Glez RN  08/12/23 6571

## 2023-08-13 NOTE — CARE COORDINATION
CM consult per Dr Samantha Jackson to assist with discharge planning for pt who needs refill on BP medication. Pt stated he can't afford even $4 [facility] medications. Pt was seen in ER yesterday for same complaint, returns today with c/o wanting Catheter removed and again for BP medications. CM visited pt to discuss getting to the [place] for his medications. Pt alert and oriented CM introduced self and reason for visit. Pt states he medications are normally mailed to him from [place]. CM provided pt with the [place] # for transportation to the [place] to get his BP medication. Pt states he was un-aware that the [place] provided transportation. Two different # provided to pt for local [place] office for transportation and any other services that may be available for pt needs. #s for [facility] given to pt at bedside and also added to pt AVS paperwork. Pt verbalized understanding to contact them Monday morning.     Update to Dr Samantha Jackson. CAMELIA MITCHELL/CM

## 2023-08-13 NOTE — ED NOTES
Patient given discharge instructions at this time. Verbalizes understanding and denies any further needs, questions, concerns or complaints. Patient ambulatory to front of ER in standing, up position in stable condition. Patient discharged with [facility].       Marcelo Wiseman RN  08/12/23 5232

## 2023-08-20 ENCOUNTER — HOSPITAL ENCOUNTER (EMERGENCY)
Age: 73
Discharge: HOME | End: 2023-08-20
Payer: OTHER GOVERNMENT

## 2023-08-20 VITALS
RESPIRATION RATE: 16 BRPM | WEIGHT: 175 LBS | SYSTOLIC BLOOD PRESSURE: 184 MMHG | DIASTOLIC BLOOD PRESSURE: 94 MMHG | TEMPERATURE: 98.6 F | BODY MASS INDEX: 25.11 KG/M2 | HEART RATE: 84 BPM | OXYGEN SATURATION: 96 %

## 2023-08-20 DIAGNOSIS — R33.9 URINARY RETENTION: Primary | ICD-10-CM

## 2023-08-20 PROCEDURE — 99283 EMERGENCY DEPT VISIT LOW MDM: CPT

## 2023-08-20 PROCEDURE — 51702 INSERT TEMP BLADDER CATH: CPT

## 2023-08-20 PROCEDURE — 51798 US URINE CAPACITY MEASURE: CPT

## 2023-08-20 ASSESSMENT — PAIN SCALES - GENERAL: PAINLEVEL_OUTOF10: 5

## 2023-08-20 ASSESSMENT — PAIN DESCRIPTION - LOCATION: LOCATION: OTHER (COMMENT);PENIS

## 2023-08-20 ASSESSMENT — ENCOUNTER SYMPTOMS
ABDOMINAL PAIN: 0
NAUSEA: 0

## 2023-08-20 ASSESSMENT — PAIN - FUNCTIONAL ASSESSMENT: PAIN_FUNCTIONAL_ASSESSMENT: 0-10

## 2023-08-20 NOTE — ED TRIAGE NOTES
Pt presents with complaint of catheter pain.  Catheter was placed last Saturday, wants removed for a .

## 2023-08-20 NOTE — ED NOTES
Report given to Lucila Leach and Surekha DENISE at this time, care assumed.       Eduardo Vaca RN  08/20/23 5839

## 2023-08-20 NOTE — DISCHARGE INSTRUCTIONS
Please be seen at the urology's office tomorrow morning, Tuesday morning for ED follow-up and for removal of your catheter. As discussed with you today, you can contact numbers below to arrange transportation. Return to emergency department if you develop any abdominal pain, confusion, worsening symptoms or any new concerns. Call [address] [place] [address] office for ride to [place] [phone]. Local [facility] office for questions and concerns [phone].

## 2023-08-20 NOTE — ED PROVIDER NOTES
evidence of any soft tissue infection. I did have a detailed discussion with patient regarding his reason for visit, he states that he just wants to have the Huerta catheter removed. He does mention recent passing of his wife and wanted to remove for the .  In reviewing external records, patient had this placed by urology, this was during the ED visit approximately 10 days ago he had a subsequent ED visit the day after that for some blood pressure medications, but appears he was to follow-up in 1 week neurology that which he has not yet. He is wanting to do this, does describe some limitations with transportation. Patient was discussed with case management who had reviewed patient's chart he has had similar issues in the past.  Patient does have VA benefits to be able to arrange transportation. Discussed with case management, patient states that he would be able to have his daughter bring him tomorrow if not contact the [place]. I did discuss patient with urology on-call provider who states patient can be seen in their office tomorrow if not on Tuesday but they did advised to leave the catheter in place for office removal.  This plan was discussed with patient who was agreeable to this. Patient was advised to return with any worsening symptoms or any new concerns. Condition is: mild stable chronic illness    Disposition Considerations (tests considered but not done, Shared Decision Making, Pt Expectation of Test or Tx.): CT abdomen/pelvis did consider CT imaging however Huerta catheter draining, normal vitals and blood work was considered however patient at baseline with no other concerns low suspicion for acute kidney injury or infectious process     Appropriate for outpatient management             Disposition: Discussed need to follow up diagnostics, including incidental findings.  Discharged with instructions to obtain outpatient follow up of patient's symptoms and findings, with strict return

## 2023-08-20 NOTE — CARE COORDINATION
CM - Room 30  per request from [id number] [address] Martin. / [place]--CM spoke to Mr Peña informed him of purpose with discharge information . Mr Peña has been seen yesterday and previous recent visit to ER. He was requesting assistance for transportation to the Urologist in AM and explained he had a  to attend Wednesday(3 days from now) He is unable to ambulate, has VA coverage and lives with his daughter in [facility]. Mr Peña has expressed transportation difficulties prior --refer to Lilibeth Hickey notes  --he was given the [place] numbers for services  with medications and transportation. CM again gave the same information where to call and addresses -information was put into the AVS. .  Pt will be released home with [facility] transport

## 2023-08-25 ENCOUNTER — HOSPITAL ENCOUNTER (EMERGENCY)
Age: 73
Discharge: HOME | End: 2023-08-25
Payer: OTHER GOVERNMENT

## 2023-08-25 VITALS
HEART RATE: 72 BPM | SYSTOLIC BLOOD PRESSURE: 157 MMHG | OXYGEN SATURATION: 96 % | TEMPERATURE: 98.9 F | RESPIRATION RATE: 16 BRPM | DIASTOLIC BLOOD PRESSURE: 143 MMHG

## 2023-08-25 DIAGNOSIS — R33.9 URINARY RETENTION: Primary | ICD-10-CM

## 2023-08-25 PROCEDURE — 99283 EMERGENCY DEPT VISIT LOW MDM: CPT

## 2023-08-25 RX ORDER — OXYBUTYNIN CHLORIDE 5 MG/1
5 TABLET ORAL 3 TIMES DAILY
Qty: 30 TABLET | Refills: 0 | Status: SHIPPED | OUTPATIENT
Start: 2023-08-25

## 2023-08-25 NOTE — CARE COORDINATION
CM consulted for [facility]. Pt has had two previous ed visits 8/14/23 and 8/20/23 both for the same complaint of pain with urinary cath. Pt comes in today for the same thing. Previous CM have given pt resources for help with transport. Pt follows at the [address] [place] [address]. Pt states he has an appointment with urology on Sept. 27. On chart review last ed visit on 8/20/23 Urology was paged and pt was supposed to follow in their office first thing Monday or Tuesday. This plan was discussed with pt and his daughter at the time by DUANE CHERRY. CM Monisha Garcia and PRADEEP went and spoke with pt. Offered HC to help with cath till he can make it to Urology. Pt states they did not have anyone to send. Reviewing chart pt was seen by [facility] nurse on 8/11/23 because they sent pt to ed for high blood pressure. Pt requested to use phone and called his daughter. CM returned to room. Pt states daughter wants pt to stay in hospital till he can seen by urology here tomorrow. CM did tell pt that we would not keep him in hospital just to see a doctor when it can be done outpt. CM Wenceslao placed HC orders. Printed orders for [facility]. Called [facility] at [phone]. Left  for [facility] requesting a call back. Updated Joi CHERRY. Pt is being d/c. CM faxed orders, face sheet and AVS to [facility] @[phone].

## 2023-08-25 NOTE — ED PROVIDER NOTES
EMERGENCY DEPARTMENT ENCOUNTER        Pt Name: Charli Sanchez  MRN: [id number]  [address] 1950  Date of evaluation: 8/25/2023  Provider: Rafaela Mead  PCP: No primary care provider on file. ZANE. I have evaluated this patient. Triage CHIEF COMPLAINT       Chief Complaint   Patient presents with    Urinary Catheter     Problem          HISTORY OF PRESENT ILLNESS      Chief Complaint: Wants urinary catheter removed    Charli Sanchez is a 72 y.o. male who presents to have his urinary catheter removed. Patient had his sullivan catheter placed about 10 days ago. He states he wants it removed because he tripped over the tubing today. He finds it uncomfortable and states he also wants to be able to sit upright and eat his dinner, which he cannot do with the catheter in place. He has not scheduled FU with Urology. He states he attempted to make an appointment at the [place] where he receives most of his care, but they cannot see him until 9/20. He states they told him to have his daughter switch out the catheter but states he doesn't know how she would be able to do this because they had trouble inserting it in the first place. States he just wants to have it removed. He insists he will be able to urinate on his own. Nursing Notes were all reviewed and agreed with or any disagreements were addressed in the HPI. REVIEW OF SYSTEMS     CONSTITUTIONAL:  Denies fever. EYES:  Denies visual changes. HEAD:  Denies headache. ENT:  Denies earache, nasal congestion, sore throat. NECK:  Denies neck pain. RESPIRATORY:  Denies any shortness of breath. CARDIOVASCULAR:  Denies chest pain. GI:  Denies nausea or vomiting. :  + urinary retention. MUSCULOSKELETAL:  Denies extremity pain or swelling. BACK:  Denies back pain. INTEGUMENT:  Denies skin changes. LYMPHATIC:  Denies lymphadenopathy. NEUROLOGIC:  Denies any numbness/tingling. PSYCHIATRIC:  Denies SI/HI.     PAST MEDICAL HISTORY Recommends starting Oxybutynin immediate release to assist with any discomfort. Given instructions to stop this the night before FU appointment. Social Determinants : Patient has significant healthcare illiteracy    Records Reviewed : Inpatient Notes from previous visits for the same on , ,       Patient's EMR reviewed for information on past medical history, current medications, and any pertinent inpatient/outpatient encounters. ED Course/Reassessment/Disposition:  Patient was seen and examined. He is requesting removal of his sullivan catheter. We discussed at length that I do not recommend this as he has not followed up with Urology and could still have retention upon removal and need sullivan replaced. I did discuss with Case Management and we discussed arranging for catheter care via [facility]  [address] [facility]. However, when this was discussed with his daughter, she was not satisfied. I spoke at length with his daughter on the phone. She states he has appointment with [address] [place] [address] Urology on  and doesn't feel this is soon enough. I discussed how patient was instructed to FU with [facility] Urology on Monday or Tuesday of this past week after his visit on  and she does not have a reason for patient not attending follow up other than that she is busy, has children, is planning her mother's .  She states that she doesn't get paid to take care of her dad. We also discussed contacting the [address] [place] [address] for assistance with transportation issues, which she has not done. She tells me a [address] nurse was supposed to come out today for catheter care but did not show up. She states she called the [address] [place] [address] but there was no resolution to the issue. She requests admission of patient multiple times and was advised that he does not meet admission criteria at this time. I did discuss the case with Laura Joseph with Urology as discussed above. It is also his recommendation that patient NOT have the catheter removed in the ED.   I discussed

## 2023-09-09 ENCOUNTER — HOSPITAL ENCOUNTER (EMERGENCY)
Age: 73
Discharge: HOME | End: 2023-09-09
Attending: EMERGENCY MEDICINE
Payer: OTHER GOVERNMENT

## 2023-09-09 VITALS
DIASTOLIC BLOOD PRESSURE: 82 MMHG | HEART RATE: 74 BPM | RESPIRATION RATE: 16 BRPM | OXYGEN SATURATION: 98 % | SYSTOLIC BLOOD PRESSURE: 186 MMHG | TEMPERATURE: 98.1 F

## 2023-09-09 DIAGNOSIS — T83.091A OBSTRUCTION OF INDWELLING URINARY CATHETER, INITIAL ENCOUNTER (HCC): Primary | ICD-10-CM

## 2023-09-09 PROCEDURE — 99283 EMERGENCY DEPT VISIT LOW MDM: CPT

## 2023-09-09 NOTE — ED PROVIDER NOTES
EMERGENCY DEPARTMENT ENCOUNTER      CHIEF COMPLAINT:   Huerta catheter not draining    HPI: Sameer Bui is a 72 y.o. male who presents to the emergency department, with complaints that his indwelling Huerta catheter is not draining. He states it stopped draining yesterday. He is very uncomfortable with suprapubic pressure and feels like he needs to have his bladder emptied. Symptoms have been constant. He denies any associated nausea or vomiting. He denies diarrhea or constipation. He denies any other complaints. REVIEW OF SYSTEMS:   Constitutional:  Denies fever or chills  Eyes:  Denies change in visual acuity  HENT:  Denies nasal congestion or sore throat  Respiratory:  Denies cough or shortness of breath  Cardiovascular:  Denies chest pain or edema  GI:  Denies abdominal pain, nausea, vomiting, bloody stools or diarrhea  :  Denies dysuria  Musculoskeletal:  Denies back pain or joint pain  Integument:  Denies rash  Neurologic:  Denies headache, focal weakness or sensory changes  \"Remaining review of systems reviewed and negative. I have reviewed the nursing triage documentation and agree unless otherwise noted below. \"      PAST MEDICAL HISTORY:   Past Medical History:   Diagnosis Date    Cerebral artery occlusion with cerebral infarction [facility])     H/O percutaneous left heart catheterization 05/08/2023    Multivessel CAD    Hypertension        CURRENT MEDICATIONS:   Home medications reviewed. SURGICAL HISTORY:   Past Surgical History:   Procedure Laterality Date    KNEE ARTHROPLASTY         FAMILY HISTORY:   No family history on file.     SOCIAL HISTORY:   Social History     Socioeconomic History    Marital status: Legally      Spouse name: Not on file    Number of children: Not on file    Years of education: Not on file    Highest education level: Not on file   Occupational History    Not on file   Tobacco Use    Smoking status: Former     Packs/day: 1.5     Types: Cigarettes, E-Cigarettes

## 2023-09-09 NOTE — ED NOTES
[phone] called [facility] for BLS unit to transport patient back to private residence. Spoke with Micheal Gutierrez at dispatch. ETA 12noon.      Jomar Jean Baptiste  09/09/23 7207

## 2023-09-09 NOTE — ED TRIAGE NOTES
Pt arrives via EMS from home with c/o indwelling sullivan not draining. Pt sullivan has not been draining since yesterday. RN assessment reveals catheter pinched in half by leg dos santos. Cath unpinched, 1L clear yellow urine drained into cath bag, Pt states significant relief.

## 2023-09-14 ENCOUNTER — APPOINTMENT (OUTPATIENT)
Dept: GENERAL RADIOLOGY | Age: 73
End: 2023-09-14
Payer: OTHER GOVERNMENT

## 2023-09-14 ENCOUNTER — HOSPITAL ENCOUNTER (EMERGENCY)
Age: 73
Discharge: HOME | End: 2023-09-15
Payer: OTHER GOVERNMENT

## 2023-09-14 VITALS
HEART RATE: 95 BPM | OXYGEN SATURATION: 96 % | RESPIRATION RATE: 14 BRPM | DIASTOLIC BLOOD PRESSURE: 90 MMHG | SYSTOLIC BLOOD PRESSURE: 172 MMHG | TEMPERATURE: 98.2 F

## 2023-09-14 DIAGNOSIS — N39.0 URINARY TRACT INFECTION ASSOCIATED WITH INDWELLING URETHRAL CATHETER, INITIAL ENCOUNTER (HCC): Primary | ICD-10-CM

## 2023-09-14 DIAGNOSIS — M25.562 PAIN IN BOTH KNEES, UNSPECIFIED CHRONICITY: ICD-10-CM

## 2023-09-14 DIAGNOSIS — M25.561 PAIN IN BOTH KNEES, UNSPECIFIED CHRONICITY: ICD-10-CM

## 2023-09-14 DIAGNOSIS — T83.511A URINARY TRACT INFECTION ASSOCIATED WITH INDWELLING URETHRAL CATHETER, INITIAL ENCOUNTER (HCC): Primary | ICD-10-CM

## 2023-09-14 LAB
BACTERIA: ABNORMAL /HPF
BILIRUBIN URINE: NEGATIVE MG/DL
BLOOD, URINE: ABNORMAL
CLARITY: ABNORMAL
COLOR: YELLOW
GLUCOSE, URINE: NEGATIVE MG/DL
KETONES, URINE: NEGATIVE MG/DL
LEUKOCYTE ESTERASE, URINE: ABNORMAL
MUCUS: ABNORMAL HPF
NITRITE URINE, QUANTITATIVE: POSITIVE
PH, URINE: 6 (ref 5–8)
PROTEIN UA: 100 MG/DL
RBC URINE: 6 /HPF (ref 0–3)
SPECIFIC GRAVITY UA: 1.02 (ref 1–1.03)
TRICHOMONAS: ABNORMAL /HPF
UROBILINOGEN, URINE: 0.2 MG/DL (ref 0.2–1)
WBC CLUMP: ABNORMAL /HPF
WBC UA: 141 /HPF (ref 0–2)

## 2023-09-14 PROCEDURE — 6360000002 HC RX W HCPCS: Performed by: PHYSICIAN ASSISTANT

## 2023-09-14 PROCEDURE — 96365 THER/PROPH/DIAG IV INF INIT: CPT

## 2023-09-14 PROCEDURE — 81001 URINALYSIS AUTO W/SCOPE: CPT

## 2023-09-14 PROCEDURE — 2580000003 HC RX 258: Performed by: PHYSICIAN ASSISTANT

## 2023-09-14 PROCEDURE — 73564 X-RAY EXAM KNEE 4 OR MORE: CPT

## 2023-09-14 PROCEDURE — 87086 URINE CULTURE/COLONY COUNT: CPT

## 2023-09-14 PROCEDURE — 99284 EMERGENCY DEPT VISIT MOD MDM: CPT

## 2023-09-14 PROCEDURE — 6370000000 HC RX 637 (ALT 250 FOR IP): Performed by: PHYSICIAN ASSISTANT

## 2023-09-14 RX ORDER — HYDROCODONE BITARTRATE AND ACETAMINOPHEN 5; 325 MG/1; MG/1
1 TABLET ORAL EVERY 6 HOURS PRN
Qty: 10 TABLET | Refills: 0 | Status: SHIPPED | OUTPATIENT
Start: 2023-09-14 | End: 2023-09-17

## 2023-09-14 RX ORDER — CEPHALEXIN 500 MG/1
500 CAPSULE ORAL 4 TIMES DAILY
Qty: 40 CAPSULE | Refills: 0 | Status: SHIPPED | OUTPATIENT
Start: 2023-09-14 | End: 2023-09-24

## 2023-09-14 RX ORDER — HYDROCODONE BITARTRATE AND ACETAMINOPHEN 5; 325 MG/1; MG/1
1 TABLET ORAL ONCE
Status: COMPLETED | OUTPATIENT
Start: 2023-09-14 | End: 2023-09-14

## 2023-09-14 RX ADMIN — HYDROCODONE BITARTRATE AND ACETAMINOPHEN 1 TABLET: 5; 325 TABLET ORAL at 20:38

## 2023-09-14 RX ADMIN — CEFTRIAXONE SODIUM 1000 MG: 1 INJECTION, POWDER, FOR SOLUTION INTRAMUSCULAR; INTRAVENOUS at 21:40

## 2023-09-14 NOTE — ED PROVIDER NOTES
EMERGENCY DEPARTMENT ENCOUNTER        Pt Name: Iram Borrero  MRN: [id number]  [address] [facility] [place] 1950  Date of evaluation: 9/14/2023  Provider: Ethan Dennis  PCP: No primary care provider on file. ZANE. I have evaluated this patient. Triage CHIEF COMPLAINT       Chief Complaint   Patient presents with    Dysuria     Daughter suspects uti \"based on the smell\"         HISTORY OF PRESENT ILLNESS      Chief Complaint: Possible UTI, knee pain    Iram Borrero is a 72 y.o. male who presents with a possible UTI. Patient states his daughter thinks he has a UTI because his urine has been foul smelling since yesterday. He does have a sullivan catheter in place and states it has been draining normally. Denies abd pain or flank pain. Denies n/v.  He also complains of bilateral knee pain. He states he fell about 2 weeks ago and landed on the knees. States pain has gradually worsened since then. He is unsure if he was evaluated following the fall. He reports multiple knee surgeries in the past.      Nursing Notes were all reviewed and agreed with or any disagreements were addressed in the HPI. REVIEW OF SYSTEMS     CONSTITUTIONAL:  Denies fever. EYES:  Denies visual changes. HEAD:  Denies headache. ENT:  Denies earache, nasal congestion, sore throat. NECK:  Denies neck pain. RESPIRATORY:  Denies any shortness of breath. CARDIOVASCULAR:  Denies chest pain. GI:  Denies nausea or vomiting. :  + foul smelling urine. MUSCULOSKELETAL:  + bilateral knee pain. BACK:  Denies back pain. INTEGUMENT:  Denies skin changes. LYMPHATIC:  Denies lymphadenopathy. NEUROLOGIC:  Denies any numbness/tingling.       PAST MEDICAL HISTORY     Past Medical History:   Diagnosis Date    Cerebral artery occlusion with cerebral infarction [facility])     H/O percutaneous left heart catheterization 05/08/2023    Multivessel CAD    Hypertension        SURGICAL HISTORY     Past Surgical History:   Procedure Laterality Date medications:  Medications   HYDROcodone-acetaminophen (NORCO) 5-325 MG per tablet 1 tablet (1 tablet Oral Given 9/14/23 2038)   cefTRIAXone (ROCEPHIN) 1,000 mg in sodium chloride 0.9 % 50 mL IVPB (mini-bag) (0 mg IntraVENous Stopped 9/14/23 2241)       Independent Imaging Interpretation by me:  None. I did visualize imaging studies but interpretation performed by radiologist.      EKG (if obtained):  Please see supervising physician's note for interpretation. Chronic conditions affecting care:   Past Medical History:   Diagnosis Date    Cerebral artery occlusion with cerebral infarction [facility])     H/O percutaneous left heart catheterization 05/08/2023    Multivessel CAD    Hypertension        Discussion with Other Profesionals : None    Social Determinants : None    Records Reviewed : Inpatient Notes from previous visits     Patient's EMR reviewed for information on past medical history, current medications, and any pertinent inpatient/outpatient encounters. ED Course/Reassessment/Disposition:  Patient seen and examined. UA obtained as well as XRs of the knees. Results were discussed with patient--urine is nitrite positive with small leuks, 141 WBC, occasional bacteria. Culture sent. Rocephin given in the ED and will dc with Keflex. XR of the knees show effusions bilaterally. Upon re-examination, patient sleeping comfortably. Will dc with a short course of [place] as well. FU with Urology and Orthopedics. Return as needed. Disposition Considerations (tests considered but not done, Shared Decision Making, Patient Expectation of Test or Treatment):   Appropriate for outpatient management      I am the Primary Clinician of Record. Supervising physician was Dr. Cesar Rey. Patient was seen independently. CLINICAL IMPRESSION      1.  Urinary tract infection associated with indwelling urethral catheter, initial encounter ([address])    2. Pain in both knees, unspecified chronicity          DISPOSITION/PLAN

## 2023-09-15 LAB
CULTURE: NORMAL
Lab: NORMAL
SPECIMEN: NORMAL

## 2023-09-18 ENCOUNTER — HOSPITAL ENCOUNTER (EMERGENCY)
Age: 73
Discharge: HOME | End: 2023-09-19
Payer: OTHER GOVERNMENT

## 2023-09-18 ENCOUNTER — APPOINTMENT (OUTPATIENT)
Dept: ULTRASOUND IMAGING | Age: 73
End: 2023-09-18
Payer: OTHER GOVERNMENT

## 2023-09-18 VITALS
DIASTOLIC BLOOD PRESSURE: 56 MMHG | TEMPERATURE: 98.6 F | SYSTOLIC BLOOD PRESSURE: 121 MMHG | OXYGEN SATURATION: 100 % | HEART RATE: 72 BPM | BODY MASS INDEX: 25.11 KG/M2 | RESPIRATION RATE: 18 BRPM | WEIGHT: 175 LBS

## 2023-09-18 DIAGNOSIS — N39.0 URINARY TRACT INFECTION ASSOCIATED WITH INDWELLING URETHRAL CATHETER, INITIAL ENCOUNTER (HCC): Primary | ICD-10-CM

## 2023-09-18 DIAGNOSIS — T83.511A URINARY TRACT INFECTION ASSOCIATED WITH INDWELLING URETHRAL CATHETER, INITIAL ENCOUNTER (HCC): Primary | ICD-10-CM

## 2023-09-18 DIAGNOSIS — R33.8 URINARY RETENTION DUE TO BENIGN PROSTATIC HYPERPLASIA: ICD-10-CM

## 2023-09-18 DIAGNOSIS — N40.1 URINARY RETENTION DUE TO BENIGN PROSTATIC HYPERPLASIA: ICD-10-CM

## 2023-09-18 DIAGNOSIS — G89.29 CHRONIC PAIN OF LEFT KNEE: ICD-10-CM

## 2023-09-18 DIAGNOSIS — M25.562 CHRONIC PAIN OF LEFT KNEE: ICD-10-CM

## 2023-09-18 LAB
ALBUMIN SERPL-MCNC: 3.6 GM/DL (ref 3.4–5)
ALP BLD-CCNC: 134 IU/L (ref 40–129)
ALT SERPL-CCNC: 19 U/L (ref 10–40)
ANION GAP SERPL CALCULATED.3IONS-SCNC: 13 MMOL/L (ref 4–16)
AST SERPL-CCNC: 19 IU/L (ref 15–37)
BACTERIA: NEGATIVE /HPF
BASOPHILS ABSOLUTE: 0.1 K/CU MM
BASOPHILS RELATIVE PERCENT: 0.6 % (ref 0–1)
BILIRUB SERPL-MCNC: 0.2 MG/DL (ref 0–1)
BILIRUBIN URINE: NEGATIVE MG/DL
BLOOD, URINE: ABNORMAL
BUN SERPL-MCNC: 29 MG/DL (ref 6–23)
CALCIUM SERPL-MCNC: 9.3 MG/DL (ref 8.3–10.6)
CHLORIDE BLD-SCNC: 107 MMOL/L (ref 99–110)
CLARITY: CLEAR
CO2: 19 MMOL/L (ref 21–32)
COLOR: YELLOW
CREAT SERPL-MCNC: 1.9 MG/DL (ref 0.9–1.3)
DIFFERENTIAL TYPE: ABNORMAL
EOSINOPHILS ABSOLUTE: 0.3 K/CU MM
EOSINOPHILS RELATIVE PERCENT: 2.3 % (ref 0–3)
GFR SERPL CREATININE-BSD FRML MDRD: 37 ML/MIN/1.73M2
GLUCOSE SERPL-MCNC: 129 MG/DL (ref 70–99)
GLUCOSE, URINE: NEGATIVE MG/DL
HCT VFR BLD CALC: 36.1 % (ref 42–52)
HEMOGLOBIN: 11.3 GM/DL (ref 13.5–18)
IMMATURE NEUTROPHIL %: 0.7 % (ref 0–0.43)
KETONES, URINE: NEGATIVE MG/DL
LEUKOCYTE ESTERASE, URINE: ABNORMAL
LYMPHOCYTES ABSOLUTE: 1.1 K/CU MM
LYMPHOCYTES RELATIVE PERCENT: 8.2 % (ref 24–44)
MCH RBC QN AUTO: 28.6 PG (ref 27–31)
MCHC RBC AUTO-ENTMCNC: 31.3 % (ref 32–36)
MCV RBC AUTO: 91.4 FL (ref 78–100)
MONOCYTES ABSOLUTE: 0.9 K/CU MM
MONOCYTES RELATIVE PERCENT: 6.5 % (ref 0–4)
MUCUS: ABNORMAL HPF
NITRITE URINE, QUANTITATIVE: POSITIVE
NUCLEATED RBC %: 0 %
PDW BLD-RTO: 13.8 % (ref 11.7–14.9)
PH, URINE: 5.5 (ref 5–8)
PLATELET # BLD: 322 K/CU MM (ref 140–440)
PMV BLD AUTO: 10.6 FL (ref 7.5–11.1)
POTASSIUM SERPL-SCNC: 4.2 MMOL/L (ref 3.5–5.1)
PROTEIN UA: >300 MG/DL
RBC # BLD: 3.95 M/CU MM (ref 4.6–6.2)
RBC URINE: 12 /HPF (ref 0–3)
SEGMENTED NEUTROPHILS ABSOLUTE COUNT: 11.3 K/CU MM
SEGMENTED NEUTROPHILS RELATIVE PERCENT: 81.7 % (ref 36–66)
SODIUM BLD-SCNC: 139 MMOL/L (ref 135–145)
SPECIFIC GRAVITY UA: 1.02 (ref 1–1.03)
SQUAMOUS EPITHELIAL: <1 /HPF
TOTAL IMMATURE NEUTOROPHIL: 0.1 K/CU MM
TOTAL NUCLEATED RBC: 0 K/CU MM
TOTAL PROTEIN: 7.4 GM/DL (ref 6.4–8.2)
TRICHOMONAS: ABNORMAL /HPF
UROBILINOGEN, URINE: 0.2 MG/DL (ref 0.2–1)
WBC # BLD: 13.9 K/CU MM (ref 4–10.5)
WBC UA: 313 /HPF (ref 0–2)

## 2023-09-18 PROCEDURE — 85025 COMPLETE CBC W/AUTO DIFF WBC: CPT

## 2023-09-18 PROCEDURE — 80053 COMPREHEN METABOLIC PANEL: CPT

## 2023-09-18 PROCEDURE — 2580000003 HC RX 258: Performed by: NURSE PRACTITIONER

## 2023-09-18 PROCEDURE — 6360000002 HC RX W HCPCS: Performed by: NURSE PRACTITIONER

## 2023-09-18 PROCEDURE — 93971 EXTREMITY STUDY: CPT

## 2023-09-18 PROCEDURE — 6370000000 HC RX 637 (ALT 250 FOR IP): Performed by: NURSE PRACTITIONER

## 2023-09-18 PROCEDURE — 87086 URINE CULTURE/COLONY COUNT: CPT

## 2023-09-18 PROCEDURE — 81001 URINALYSIS AUTO W/SCOPE: CPT

## 2023-09-18 PROCEDURE — 96375 TX/PRO/DX INJ NEW DRUG ADDON: CPT

## 2023-09-18 PROCEDURE — 96365 THER/PROPH/DIAG IV INF INIT: CPT

## 2023-09-18 PROCEDURE — 99284 EMERGENCY DEPT VISIT MOD MDM: CPT

## 2023-09-18 RX ORDER — SULFAMETHOXAZOLE AND TRIMETHOPRIM 800; 160 MG/1; MG/1
1 TABLET ORAL 2 TIMES DAILY
Qty: 20 TABLET | Refills: 0 | Status: SHIPPED | OUTPATIENT
Start: 2023-09-18 | End: 2023-09-18 | Stop reason: SDUPTHER

## 2023-09-18 RX ORDER — SULFAMETHOXAZOLE AND TRIMETHOPRIM 800; 160 MG/1; MG/1
1 TABLET ORAL 2 TIMES DAILY
Qty: 20 TABLET | Refills: 0 | Status: SHIPPED | OUTPATIENT
Start: 2023-09-18 | End: 2023-09-28

## 2023-09-18 RX ORDER — HYDROCODONE BITARTRATE AND ACETAMINOPHEN 5; 325 MG/1; MG/1
1 TABLET ORAL ONCE
Status: COMPLETED | OUTPATIENT
Start: 2023-09-18 | End: 2023-09-18

## 2023-09-18 RX ORDER — MORPHINE SULFATE 4 MG/ML
4 INJECTION, SOLUTION INTRAMUSCULAR; INTRAVENOUS
Status: COMPLETED | OUTPATIENT
Start: 2023-09-18 | End: 2023-09-18

## 2023-09-18 RX ORDER — LIDOCAINE HYDROCHLORIDE 20 MG/ML
JELLY TOPICAL PRN
Status: DISCONTINUED | OUTPATIENT
Start: 2023-09-18 | End: 2023-09-19 | Stop reason: HOSPADM

## 2023-09-18 RX ADMIN — MORPHINE SULFATE 4 MG: 4 INJECTION, SOLUTION INTRAMUSCULAR; INTRAVENOUS at 11:27

## 2023-09-18 RX ADMIN — CEFTRIAXONE SODIUM 1000 MG: 1 INJECTION, POWDER, FOR SOLUTION INTRAMUSCULAR; INTRAVENOUS at 16:15

## 2023-09-18 RX ADMIN — HYDROCODONE BITARTRATE AND ACETAMINOPHEN 1 TABLET: 5; 325 TABLET ORAL at 18:17

## 2023-09-18 ASSESSMENT — PAIN DESCRIPTION - LOCATION
LOCATION: KNEE
LOCATION: KNEE

## 2023-09-18 ASSESSMENT — PAIN DESCRIPTION - PAIN TYPE: TYPE: ACUTE PAIN

## 2023-09-18 ASSESSMENT — PAIN SCALES - GENERAL
PAINLEVEL_OUTOF10: 10
PAINLEVEL_OUTOF10: 10

## 2023-09-18 ASSESSMENT — PAIN DESCRIPTION - ORIENTATION
ORIENTATION: LEFT
ORIENTATION: LEFT

## 2023-09-18 ASSESSMENT — PAIN DESCRIPTION - ONSET: ONSET: ON-GOING

## 2023-09-18 ASSESSMENT — PAIN DESCRIPTION - DESCRIPTORS
DESCRIPTORS: ACHING;SHARP
DESCRIPTORS: SHARP

## 2023-09-18 ASSESSMENT — PAIN DESCRIPTION - FREQUENCY: FREQUENCY: INTERMITTENT

## 2023-09-18 NOTE — ED NOTES
[id number] called [id number] for BLS unit to transport patient back to private residence. Spoke with Germán Ortega at dispatch.  ETA [id number]     Jomar Jean Baptiste  09/18/23 1656

## 2023-09-18 NOTE — ACP (ADVANCE CARE PLANNING)
Patient does not have any ACP documents/Medical Power of . LSW notes hospital will follow [place]'s Next of Kin hierarchy in the following descending order for priority:    Guardian  Spouse  Majority of adult Children  Parents  Majority of adult Siblings  Nearest Relative not described above    Per [place] Next of Kin hierarchy: Patients' child will be [address]

## 2023-09-18 NOTE — ED PROVIDER NOTES
Does not appear concerning for septic joint. Integument:  Warm, Dry, No rashes. Neurologic:  Alert & oriented x4, No focal deficits noted. DIAGNOSTIC RESULTS   LABS:    Labs Reviewed   CBC WITH AUTO DIFFERENTIAL - Abnormal; Notable for the following components:       Result Value    WBC 13.9 (*)     RBC 3.95 (*)     Hemoglobin 11.3 (*)     Hematocrit 36.1 (*)     MCHC 31.3 (*)     Segs Relative 81.7 (*)     Lymphocytes % 8.2 (*)     Monocytes % 6.5 (*)     Immature Neutrophil % 0.7 (*)     All other components within normal limits   COMPREHENSIVE METABOLIC PANEL - Abnormal; Notable for the following components:    CO2 19 (*)     BUN 29 (*)     Creatinine 1.9 (*)     Est, Glom Filt Rate 37 (*)     Glucose 129 (*)     Alkaline Phosphatase 134 (*)     All other components within normal limits   URINALYSIS - Abnormal; Notable for the following components:    Blood, Urine SMALL NUMBER OR AMOUNT OBSERVED (*)     Protein, UA >300 (*)     Nitrite Urine, Quantitative POSITIVE (*)     Leukocyte Esterase, Urine SMALL NUMBER OR AMOUNT OBSERVED (*)     All other components within normal limits   MICROSCOPIC URINALYSIS - Abnormal; Notable for the following components:    RBC, UA 12 (*)     WBC,  (*)     Mucus, UA RARE (*)     All other components within normal limits   CULTURE, URINE       When ordered, only abnormal lab results are displayed. All other labs were within normal range or not returned as of this dictation. RADIOLOGY:   Non-plain film images such as CT, Ultrasound and MRI are read by the radiologist. Plain radiographic images are visualized and preliminarily interpreted by the  ED Provider with the below findings:    Interpretation perthe Radiologist below, if available at the time of this note:    VL DUP LOWER EXTREMITY VENOUS LEFT   Final Result   No evidence of DVT in the left lower extremity. No results found.       PROCEDURES   Unless otherwise noted below, none

## 2023-09-18 NOTE — DISCHARGE INSTRUCTIONS
Your last urine culture did not grow any bacteria. We will resend your urine for culture today and treat you with antibiotics until we receive the culture results. Please start the new antibiotics soon as possible. You need to be seen by urology given the ongoing urinary retention for evaluation of your enlarged prostate. Please contact the [place], talk to case management to see if they can assist in getting you wheelchair assisted transport out of your house by a medic in order to get you to your appointment. If you develop worsening symptoms you need to return to the ED for reevaluation. Also you need to clean around your sullivan catheter and clean your genital area once daily at least with soap and water.

## 2023-09-18 NOTE — ED NOTES
Pt to ED from home via EMS. Pt states has had severe L knee pain. Is bed ridden from a previous stroke and has L side deficit. Pt also has a urinary catheter and states that he has been having pain at catheter site. Pt states was recently here and diagnosed with a UTI.      Kieran Steel RN  09/18/23 [address], RN  09/18/23 1021

## 2023-09-19 LAB
CULTURE: NORMAL
Lab: NORMAL
SPECIMEN: NORMAL

## 2023-09-26 ENCOUNTER — HOSPITAL ENCOUNTER (EMERGENCY)
Age: 73
Discharge: HOME | End: 2023-09-27
Payer: OTHER GOVERNMENT

## 2023-09-26 DIAGNOSIS — M25.561 CHRONIC PAIN OF BOTH KNEES: ICD-10-CM

## 2023-09-26 DIAGNOSIS — T83.9XXA PROBLEM WITH FOLEY CATHETER, INITIAL ENCOUNTER (HCC): Primary | ICD-10-CM

## 2023-09-26 DIAGNOSIS — M25.562 CHRONIC PAIN OF BOTH KNEES: ICD-10-CM

## 2023-09-26 DIAGNOSIS — G89.29 CHRONIC PAIN OF BOTH KNEES: ICD-10-CM

## 2023-09-26 PROCEDURE — 6370000000 HC RX 637 (ALT 250 FOR IP): Performed by: PHYSICIAN ASSISTANT

## 2023-09-26 PROCEDURE — 99283 EMERGENCY DEPT VISIT LOW MDM: CPT

## 2023-09-26 PROCEDURE — 51702 INSERT TEMP BLADDER CATH: CPT

## 2023-09-26 RX ORDER — HYDROCODONE BITARTRATE AND ACETAMINOPHEN 5; 325 MG/1; MG/1
1 TABLET ORAL ONCE
Status: COMPLETED | OUTPATIENT
Start: 2023-09-26 | End: 2023-09-26

## 2023-09-26 RX ADMIN — HYDROCODONE BITARTRATE AND ACETAMINOPHEN 1 TABLET: 5; 325 TABLET ORAL at 23:44

## 2023-09-26 ASSESSMENT — PAIN SCALES - GENERAL: PAINLEVEL_OUTOF10: 10

## 2023-09-26 ASSESSMENT — PAIN DESCRIPTION - DESCRIPTORS: DESCRIPTORS: ACHING

## 2023-09-26 ASSESSMENT — PAIN DESCRIPTION - ORIENTATION: ORIENTATION: RIGHT;LEFT

## 2023-09-26 ASSESSMENT — PAIN DESCRIPTION - LOCATION: LOCATION: KNEE

## 2023-09-27 VITALS
BODY MASS INDEX: 25.05 KG/M2 | WEIGHT: 175 LBS | HEART RATE: 56 BPM | SYSTOLIC BLOOD PRESSURE: 107 MMHG | OXYGEN SATURATION: 97 % | RESPIRATION RATE: 14 BRPM | HEIGHT: 70 IN | DIASTOLIC BLOOD PRESSURE: 60 MMHG | TEMPERATURE: 98 F

## 2023-09-27 NOTE — ED NOTES
Report given to Easley at this time. All questions answered about patient care.       Sharron Tony RN  09/27/23 0036

## 2023-09-27 NOTE — ED PROVIDER NOTES
EMERGENCY DEPARTMENT ENCOUNTER        Pt Name: Ellyn Gonzales  MRN: [id number]  [address] [facility] [place] 1950  Date of evaluation: 9/26/2023  Provider: Ramez Torres  PCP: No primary care provider on file. ZANE. I have evaluated this patient. Triage CHIEF COMPLAINT       Chief Complaint   Patient presents with    Catheter Problem       HISTORY OF PRESENT ILLNESS      Chief Complaint: Leaking from catheter, knee pain    Ellyn Gonzales is a 72 y.o. male who presents with leaking from his catheter. He has an indwelling sullivan catheter. States he keeps it in a bucket at home and there is also urine leaking in the bucket. States this has been going on for 2-3 days. He does not feel it is leaking from around the tubing but maybe the bag itself. He states both knees are aching. He reports an old baseball injury to the left knee and an old fall on black ice that caused injury to the right knee. Denies any new injury. Denies any swelling, redness, warmth. Nursing Notes were all reviewed and agreed with or any disagreements were addressed in the HPI. REVIEW OF SYSTEMS     CONSTITUTIONAL:  Denies fever. EYES:  Denies visual changes. HEAD:  Denies headache. ENT:  Denies earache, nasal congestion, sore throat. NECK:  Denies neck pain. RESPIRATORY:  Denies any shortness of breath. CARDIOVASCULAR:  Denies chest pain. GI:  Denies nausea or vomiting. :  + catheter problem. MUSCULOSKELETAL:  + knee pain. BACK:  Denies back pain. INTEGUMENT:  Denies skin changes. LYMPHATIC:  Denies lymphadenopathy. NEUROLOGIC:  Denies any numbness/tingling.     PAST MEDICAL HISTORY     Past Medical History:   Diagnosis Date    Cerebral artery occlusion with cerebral infarction [facility])     H/O percutaneous left heart catheterization 05/08/2023    Multivessel CAD    Hypertension        SURGICAL HISTORY     Past Surgical History:   Procedure Laterality Date    KNEE ARTHROPLASTY         CURRENTMEDICATIONS Comment: occasional    Drug use: No       SCREENINGS    [place] Coma Scale  Eye Opening: Spontaneous  Best Verbal Response: Oriented  Best Motor Response: Obeys commands  [place] Coma Scale Score: 15      PHYSICAL EXAM       ED Triage Vitals   BP Temp Temp src Pulse Resp SpO2 Height Weight   -- -- -- -- -- -- -- --      GENERAL APPEARANCE:  Well-developed, well-nourished, no acute distress. HEAD:  NC/AT. EYES:  Sclera anicteric. ENT:  Ears, nose, mouth normal.     NECK:  Supple. CARDIO:  RRR. LUNGS:   CTAB. Respirations unlabored. ABDOMEN:  Soft, non-distended, non-tender. BS active. :  No CVA tenderness. Huerta catheter in place. No leaking from around tubing or bag on my exam.    BACK:  No midline thoracic or lumbar spinal tenderness. EXTREMITIES:  No acute deformities. SKIN:  Warm and dry. NEUROLOGICAL:  Alert and oriented. PSYCHIATRIC:  Normal mood. DIAGNOSTIC RESULTS   LABS:    Labs Reviewed - No data to display    When ordered, only abnormal lab results are displayed. All other labs were within normal range or not returned as of this dictation. EKG: When ordered, EKG's are interpreted by the Emergency Department Physician in the absence of a cardiologist.  Please see their note for interpretation of EKG. RADIOLOGY:   Non-plain film images such as CT, Ultrasound and MRI are read by the radiologist.  Plain radiographic images are visualized and preliminarily interpreted by the ED Provider. Interpretation per the Radiologist below:    No orders to display     No results found. PROCEDURES   Unless otherwise noted below, none. CONSULTS:  None      EMERGENCY DEPARTMENT COURSE and MDM:   Vitals: There were no vitals filed for this visit.     Patient was given thefollowing medications:  Medications   HYDROcodone-acetaminophen (NORCO) 5-325 MG per tablet 1 tablet (has no administration in time range)         Is this patient to be included in the SEP-1 Core Measure due to

## 2023-10-18 ENCOUNTER — HOSPITAL ENCOUNTER (EMERGENCY)
Age: 73
Discharge: HOME | End: 2023-10-18
Attending: EMERGENCY MEDICINE
Payer: OTHER GOVERNMENT

## 2023-10-18 VITALS
DIASTOLIC BLOOD PRESSURE: 69 MMHG | OXYGEN SATURATION: 99 % | WEIGHT: 173 LBS | HEART RATE: 61 BPM | TEMPERATURE: 98.2 F | HEIGHT: 70 IN | BODY MASS INDEX: 24.77 KG/M2 | SYSTOLIC BLOOD PRESSURE: 136 MMHG | RESPIRATION RATE: 16 BRPM

## 2023-10-18 DIAGNOSIS — T83.011A MALFUNCTION OF INDWELLING URINARY CATHETER, INITIAL ENCOUNTER (HCC): Primary | ICD-10-CM

## 2023-10-18 PROCEDURE — 99283 EMERGENCY DEPT VISIT LOW MDM: CPT

## 2023-10-18 ASSESSMENT — PAIN DESCRIPTION - LOCATION: LOCATION: OTHER (COMMENT)

## 2023-10-18 ASSESSMENT — PAIN SCALES - GENERAL: PAINLEVEL_OUTOF10: 1

## 2023-10-18 ASSESSMENT — PAIN - FUNCTIONAL ASSESSMENT: PAIN_FUNCTIONAL_ASSESSMENT: 0-10

## 2023-10-18 NOTE — ED PROVIDER NOTES
irrigated out the bladder and had clear urine coming out afterwards. Patient had significant treatment of his symptoms and feels much better now. No other acute issues at this time. Feels as though he can follow-up outpatient. Discharged home with return precautions. No infectious prodrome to suggest need for antibiotics. Is this patient to be included in the SEP-1 core measure due to severe sepsis or septic shock? No Exclusion criteria - the patient is NOT to be included for SEP-1 Core Measure due to: Infection is not suspected     Disposition: Discharged     I am the primary physician of record    Clinical Impression:  1. Malfunction of indwelling urinary catheter, initial encounter [facility])      Disposition referral (if applicable):  Izzy Ferrara PA-C  [address] [facility]  [phone]    Schedule an appointment as soon as possible for a visit       [facility]  [address] [facility] [address]  [phone]    If symptoms worsen    Disposition medications (if applicable):  New Prescriptions    No medications on file       Comment: Please note this report has been produced using speech recognition software and may contain errors related to that system including errors in grammar, punctuation, and spelling, as well as words and phrases that may be inappropriate. If there are any questions or concerns please feel free to contact the dictating provider for clarification.        Sabrina Garcia MD  10/18/23 4035

## 2023-10-18 NOTE — ED TRIAGE NOTES
Patient has indwelling catheter at home. Says his home health nurse changed it yesterday, says he feels like he needs to urinate and that the output has not been as much as normal. Says he feels like he needs to urinate.

## 2023-11-04 ENCOUNTER — APPOINTMENT (OUTPATIENT)
Dept: GENERAL RADIOLOGY | Age: 73
End: 2023-11-04
Payer: OTHER GOVERNMENT

## 2023-11-04 ENCOUNTER — HOSPITAL ENCOUNTER (EMERGENCY)
Age: 73
Discharge: HOME | End: 2023-11-04
Attending: EMERGENCY MEDICINE
Payer: OTHER GOVERNMENT

## 2023-11-04 VITALS
RESPIRATION RATE: 13 BRPM | WEIGHT: 173 LBS | HEIGHT: 70 IN | BODY MASS INDEX: 24.77 KG/M2 | OXYGEN SATURATION: 97 % | HEART RATE: 78 BPM | TEMPERATURE: 98 F | SYSTOLIC BLOOD PRESSURE: 124 MMHG | DIASTOLIC BLOOD PRESSURE: 74 MMHG

## 2023-11-04 DIAGNOSIS — T83.9XXA PROBLEM WITH FOLEY CATHETER, INITIAL ENCOUNTER (HCC): ICD-10-CM

## 2023-11-04 DIAGNOSIS — M17.12 OSTEOARTHRITIS OF LEFT KNEE, UNSPECIFIED OSTEOARTHRITIS TYPE: Primary | ICD-10-CM

## 2023-11-04 LAB
BACTERIA: NORMAL /HPF
BILIRUBIN URINE: NEGATIVE
BLOOD, URINE: NORMAL
CLARITY: CLEAR
COLOR: YELLOW
GLUCOSE, URINE: NEGATIVE MG/DL
HYALINE CASTS: 2 /LPF
KETONES, URINE: NEGATIVE MG/DL
LEUKOCYTE ESTERASE, URINE: NEGATIVE
MUCUS: NORMAL HPF
NITRITE URINE, QUANTITATIVE: POSITIVE
PH, URINE: 6
PROTEIN UA: >300 MG/DL
RBC URINE: 14 /HPF
SPECIFIC GRAVITY UA: 1.02
SQUAMOUS EPITHELIAL: <1 /HPF
TRICHOMONAS: NORMAL /HPF
UROBILINOGEN, URINE: 0.2 MG/DL
WBC UA: 1 /HPF

## 2023-11-04 PROCEDURE — 99284 EMERGENCY DEPT VISIT MOD MDM: CPT

## 2023-11-04 PROCEDURE — 87186 SC STD MICRODIL/AGAR DIL: CPT

## 2023-11-04 PROCEDURE — 6370000000 HC RX 637 (ALT 250 FOR IP): Performed by: EMERGENCY MEDICINE

## 2023-11-04 PROCEDURE — 73560 X-RAY EXAM OF KNEE 1 OR 2: CPT

## 2023-11-04 PROCEDURE — 81001 URINALYSIS AUTO W/SCOPE: CPT

## 2023-11-04 PROCEDURE — 87086 URINE CULTURE/COLONY COUNT: CPT

## 2023-11-04 PROCEDURE — 51798 US URINE CAPACITY MEASURE: CPT

## 2023-11-04 PROCEDURE — 87077 CULTURE AEROBIC IDENTIFY: CPT

## 2023-11-04 PROCEDURE — 51702 INSERT TEMP BLADDER CATH: CPT

## 2023-11-04 RX ORDER — HYDROCODONE BITARTRATE AND ACETAMINOPHEN 5; 325 MG/1; MG/1
1 TABLET ORAL ONCE
Status: COMPLETED | OUTPATIENT
Start: 2023-11-04 | End: 2023-11-04

## 2023-11-04 RX ORDER — TRIAMCINOLONE ACETONIDE 5 MG/G
CREAM TOPICAL
Qty: 15 G | Refills: 0 | Status: SHIPPED | OUTPATIENT
Start: 2023-11-04 | End: 2023-11-11

## 2023-11-04 RX ORDER — HYDROCODONE BITARTRATE AND ACETAMINOPHEN 5; 325 MG/1; MG/1
1 TABLET ORAL EVERY 8 HOURS PRN
Qty: 6 TABLET | Refills: 0 | Status: SHIPPED | OUTPATIENT
Start: 2023-11-04 | End: 2023-11-06

## 2023-11-04 RX ADMIN — HYDROCODONE BITARTRATE AND ACETAMINOPHEN 1 TABLET: 5; 325 TABLET ORAL at 06:41

## 2023-11-04 ASSESSMENT — PAIN SCALES - GENERAL: PAINLEVEL_OUTOF10: 10

## 2023-11-04 ASSESSMENT — PAIN - FUNCTIONAL ASSESSMENT: PAIN_FUNCTIONAL_ASSESSMENT: NONE - DENIES PAIN

## 2023-11-04 NOTE — ED NOTES
Report given to [facility], care transferred at this time.       Soumya Vidal RN  11/04/23 5496

## 2023-11-04 NOTE — ED PROVIDER NOTES
Provider, MD Davin   traZODone (DESYREL) 50 MG tablet Take 2 tablets by mouth nightly    Provider, Davin, MD        Patient Active Problem List   Diagnosis    Hypertensive urgency    CVA (cerebral vascular accident) ([address])    Benign prostate hyperplasia    Cerebral artery occlusion         SURGICAL HISTORY       Past Surgical History:   Procedure Laterality Date    KNEE ARTHROPLASTY           CURRENT MEDICATIONS       Previous Medications    AMLODIPINE (NORVASC) 10 MG TABLET    Take 1 tablet by mouth daily for 7 days    ASPIRIN 81 MG EC TABLET    Take 1 tablet by mouth daily    ATORVASTATIN (LIPITOR) 80 MG TABLET    Take 1 tablet by mouth nightly    ATORVASTATIN (LIPITOR) 80 MG TABLET    Take 1 tablet by mouth daily    CLOPIDOGREL (PLAVIX) 75 MG TABLET    Take 1 tablet by mouth daily    CLOPIDOGREL (PLAVIX) 75 MG TABLET    Take 1 tablet by mouth daily    FENOFIBRATE (TRICOR) 48 MG TABLET    Take 1 tablet by mouth daily    ICOSAPENT ETHYL (VASCEPA) 1 G CAPS CAPSULE    Take 2 capsules by mouth 2 times daily    METOPROLOL TARTRATE (LOPRESSOR) 25 MG TABLET    Take 1 tablet by mouth 2 times daily for 7 days    MIRTAZAPINE (REMERON) 30 MG TABLET    Take 1 tablet by mouth nightly    MULTIPLE VITAMIN (MULTIVITAMIN ADULT PO)    Take 1 tablet by mouth daily    OXYBUTYNIN (DITROPAN) 5 MG TABLET    Take 1 tablet by mouth 3 times daily Do NOT take the night before or morning of your appointment with Urology    SERTRALINE (ZOLOFT) 100 MG TABLET    Take 1.5 tablets by mouth daily    TAMSULOSIN (FLOMAX) 0.4 MG CAPSULE    Take 2 capsules by mouth daily for 7 days    TRAZODONE (DESYREL) 50 MG TABLET    Take 2 tablets by mouth nightly    VALSARTAN (DIOVAN) 80 MG TABLET    Take 1 tablet by mouth daily for 7 days       ALLERGIES     Cortisone    FAMILY HISTORY     History reviewed. No pertinent family history.        SOCIAL HISTORY       Social History     Socioeconomic History    Marital status: Legally      Spouse sloughing. No bulla. He does have a good amount of urine in his Huerta bag. He also complains of some left knee pain. Not had any fall or injury. He is mild diffuse pain at the knee. There is no redness at the knee. No erythema. No swelling. No clinical signs of septic joint. X-ray of knee is obtained. We did obtain a bladder scan did not show significant urine. Huerta will be replaced. Urinalysis is obtained. I did discuss with him putting a different barrier between himself and his mattress for the symptoms on his back. He will also be prescribed a steroid cream for home. He has appropriate outpatient management. X-ray of his left knee did show some osteoarthritis. He was treated with oral hydrocodone in the emergency department. Urinalysis does show nitrite positive but does not show any leukocyte esterase. I did review external records and compare this to prior urinalysis studies. His prior urinalysis studies seem to have similar findings of constant nitrites. His cultures however have not grown anything significantly. Given this, we will not start him empirically on any antibiotics. He has not been febrile in the emergency department. Does not report significant dysuria. I did discuss with him that the urine will be sent for culture he should have a follow-up with his primary care physician and urology. He is agreeable to plan of care. Prescribed triamcinolone for his back and we will change the mattress to use different barrier precautions, we will follow the results of his urine culture and he will prescribe a short course of analgesics for his knee pain. He is appropriate for outpatient management.     He is discharged home in stable condition, with strict return precautions       Amount and/or Complexity of Data Reviewed  Decide to obtain previous medical records or to obtain history from someone other than the patient: yes        -  Patient seen and evaluated

## 2023-11-04 NOTE — ED NOTES
Report received from [place], [place]. Assumed care @ this time.       [place], [place]  11/04/23 0053

## 2023-11-04 NOTE — DISCHARGE INSTRUCTIONS
Please use a new barrier between your back and the mattress to avoid further symptoms. Your knee xray showed osteoarthritis. A urine culture is pending. Please have it followed by your primary care physician or urology. Please follow-up for further catheter management. If you develop any worsening or concerning symptoms, please seek immediate medical attention.

## 2023-11-05 LAB
CULTURE: ABNORMAL
CULTURE: ABNORMAL
Lab: ABNORMAL
SPECIMEN: ABNORMAL

## 2023-11-06 LAB
CULTURE: ABNORMAL
CULTURE: ABNORMAL
Lab: ABNORMAL
SPECIMEN: ABNORMAL

## 2023-11-07 ENCOUNTER — HOSPITAL ENCOUNTER (EMERGENCY)
Age: 73
Discharge: HOME | End: 2023-11-07
Attending: EMERGENCY MEDICINE
Payer: OTHER GOVERNMENT

## 2023-11-07 VITALS
TEMPERATURE: 99.4 F | SYSTOLIC BLOOD PRESSURE: 170 MMHG | HEART RATE: 100 BPM | OXYGEN SATURATION: 99 % | DIASTOLIC BLOOD PRESSURE: 81 MMHG | RESPIRATION RATE: 16 BRPM

## 2023-11-07 DIAGNOSIS — N39.0 URINARY TRACT INFECTION ASSOCIATED WITH INDWELLING URETHRAL CATHETER, INITIAL ENCOUNTER (HCC): ICD-10-CM

## 2023-11-07 DIAGNOSIS — M17.10 ARTHRITIS OF KNEE: Primary | ICD-10-CM

## 2023-11-07 DIAGNOSIS — T83.511A URINARY TRACT INFECTION ASSOCIATED WITH INDWELLING URETHRAL CATHETER, INITIAL ENCOUNTER (HCC): ICD-10-CM

## 2023-11-07 PROCEDURE — 6370000000 HC RX 637 (ALT 250 FOR IP): Performed by: EMERGENCY MEDICINE

## 2023-11-07 PROCEDURE — 99283 EMERGENCY DEPT VISIT LOW MDM: CPT

## 2023-11-07 PROCEDURE — 6360000002 HC RX W HCPCS: Performed by: EMERGENCY MEDICINE

## 2023-11-07 RX ORDER — TRIAMCINOLONE ACETONIDE 40 MG/ML
80 INJECTION, SUSPENSION INTRA-ARTICULAR; INTRAMUSCULAR ONCE
Status: COMPLETED | OUTPATIENT
Start: 2023-11-07 | End: 2023-11-07

## 2023-11-07 RX ORDER — CIPROFLOXACIN 500 MG/1
500 TABLET, FILM COATED ORAL ONCE
Status: COMPLETED | OUTPATIENT
Start: 2023-11-07 | End: 2023-11-07

## 2023-11-07 RX ORDER — TRIAMCINOLONE ACETONIDE 40 MG/ML
80 INJECTION, SUSPENSION INTRA-ARTICULAR; INTRAMUSCULAR ONCE
Status: DISCONTINUED | OUTPATIENT
Start: 2023-11-07 | End: 2023-11-07

## 2023-11-07 RX ORDER — CIPROFLOXACIN 500 MG/1
500 TABLET, FILM COATED ORAL 2 TIMES DAILY
Qty: 14 TABLET | Refills: 0 | Status: SHIPPED | OUTPATIENT
Start: 2023-11-07 | End: 2023-11-14

## 2023-11-07 RX ADMIN — TRIAMCINOLONE ACETONIDE 80 MG: 40 INJECTION, SUSPENSION INTRA-ARTICULAR; INTRAMUSCULAR at 03:37

## 2023-11-07 RX ADMIN — CIPROFLOXACIN 500 MG: 500 TABLET, FILM COATED ORAL at 03:37

## 2023-11-07 NOTE — ED PROVIDER NOTES
CHIEF COMPLAINT    Chief Complaint   Patient presents with    Knee Pain    Huerta problem     HPI  Scotty Ruffin is a 72 y.o. male with hypertension, CVA, left hemiplegia, coronary artery disease, chronic indwelling Huerta catheter who presents to the ED via EMS with complaints of left knee pain. Patient has been experiencing chronic left knee pain for several months. He states he has been evaluated by orthopedic surgery previously with recommendations for knee replacement. He has history of hemiplegia in the left secondary to previous CVA and has some chronic flexion contracture of the left knee. He was evaluated in the emergency department on 11/04 for Huerta catheter concerns as well as left knee pain. X-ray of left knee was obtained at that time and showed severe tricompartmental osteoarthritis and no other acute findings. Urine was collected at that time and sent for culture with antibiotics deferred due to the chronic indwelling Huerta catheter. I did review urine culture from this visit and it showed Enterobacter cloacae which is sensitive to ciprofloxacin. Patient states his left knee pain is moderate to severe and unrelieved by Tylenol and NSAIDs. The pain is constant and exacerbated with movement and palpation. No new injury or trauma to the left knee. Patient denies fevers, chills, abdominal pain, nausea, vomiting      REVIEW OF SYSTEMS  Constitutional: No fever, chills  Eye: No visual changes  HENT: No earache or sore throat. Resp: No SOB or productive cough. Cardio: No chest pain or palpitations. GI: No abdominal pain, nausea, vomiting, constipation or diarrhea. No melena. : No dysuria, urgency or frequency. Endocrine: No heat intolerance, no cold intolerance, no polydipsia   Lymphatics: No adenopathy  Musculoskeletal: Complains of left knee pain  Neuro: No headaches. Psych: No homicidal or suicidal thoughts  Skin: No rash, No itching. ?  ?   PAST MEDICAL HISTORY  Past Medical History:

## 2023-11-16 ENCOUNTER — APPOINTMENT (OUTPATIENT)
Dept: GENERAL RADIOLOGY | Age: 73
End: 2023-11-16
Payer: OTHER GOVERNMENT

## 2023-11-16 ENCOUNTER — APPOINTMENT (OUTPATIENT)
Dept: GENERAL RADIOLOGY | Age: 73
End: 2023-11-16
Attending: STUDENT IN AN ORGANIZED HEALTH CARE EDUCATION/TRAINING PROGRAM
Payer: OTHER GOVERNMENT

## 2023-11-16 ENCOUNTER — HOSPITAL ENCOUNTER (EMERGENCY)
Age: 73
Discharge: HOME | End: 2023-11-16
Attending: STUDENT IN AN ORGANIZED HEALTH CARE EDUCATION/TRAINING PROGRAM
Payer: OTHER GOVERNMENT

## 2023-11-16 VITALS
HEIGHT: 70 IN | RESPIRATION RATE: 12 BRPM | TEMPERATURE: 98.2 F | OXYGEN SATURATION: 97 % | DIASTOLIC BLOOD PRESSURE: 73 MMHG | HEART RATE: 65 BPM | SYSTOLIC BLOOD PRESSURE: 111 MMHG | WEIGHT: 175 LBS | BODY MASS INDEX: 25.05 KG/M2

## 2023-11-16 DIAGNOSIS — M79.672 FOOT PAIN, LEFT: ICD-10-CM

## 2023-11-16 DIAGNOSIS — R31.9 HEMATURIA, UNSPECIFIED TYPE: Primary | ICD-10-CM

## 2023-11-16 LAB
ALBUMIN SERPL-MCNC: 3.4 GM/DL (ref 3.4–5)
ALP BLD-CCNC: 120 IU/L (ref 40–129)
ALT SERPL-CCNC: 27 U/L (ref 10–40)
ANION GAP SERPL CALCULATED.3IONS-SCNC: 12 MMOL/L (ref 4–16)
AST SERPL-CCNC: 34 IU/L (ref 15–37)
BACTERIA: NEGATIVE /HPF
BASOPHILS ABSOLUTE: 0.1 K/CU MM
BASOPHILS RELATIVE PERCENT: 0.5 % (ref 0–1)
BILIRUB SERPL-MCNC: 0.2 MG/DL (ref 0–1)
BILIRUBIN URINE: NEGATIVE MG/DL
BLOOD, URINE: ABNORMAL
BUN SERPL-MCNC: 42 MG/DL (ref 6–23)
CALCIUM SERPL-MCNC: 8.1 MG/DL (ref 8.3–10.6)
CHLORIDE BLD-SCNC: 107 MMOL/L (ref 99–110)
CLARITY: CLEAR
CO2: 19 MMOL/L (ref 21–32)
COLOR: YELLOW
CREAT SERPL-MCNC: 2.3 MG/DL (ref 0.9–1.3)
DIFFERENTIAL TYPE: ABNORMAL
EOSINOPHILS ABSOLUTE: 0.4 K/CU MM
EOSINOPHILS RELATIVE PERCENT: 3.6 % (ref 0–3)
GFR SERPL CREATININE-BSD FRML MDRD: 30 ML/MIN/1.73M2
GLUCOSE SERPL-MCNC: 99 MG/DL (ref 70–99)
GLUCOSE, URINE: NEGATIVE MG/DL
HCT VFR BLD CALC: 33.8 % (ref 42–52)
HEMOGLOBIN: 10.1 GM/DL (ref 13.5–18)
HYALINE CASTS: 5 /LPF
IMMATURE NEUTROPHIL %: 0.8 % (ref 0–0.43)
KETONES, URINE: NEGATIVE MG/DL
LEUKOCYTE ESTERASE, URINE: ABNORMAL
LYMPHOCYTES ABSOLUTE: 1.5 K/CU MM
LYMPHOCYTES RELATIVE PERCENT: 14.3 % (ref 24–44)
MCH RBC QN AUTO: 28.3 PG (ref 27–31)
MCHC RBC AUTO-ENTMCNC: 29.9 % (ref 32–36)
MCV RBC AUTO: 94.7 FL (ref 78–100)
MONOCYTES ABSOLUTE: 0.7 K/CU MM
MONOCYTES RELATIVE PERCENT: 6.7 % (ref 0–4)
MUCUS: ABNORMAL HPF
NITRITE URINE, QUANTITATIVE: NEGATIVE
NUCLEATED RBC %: 0 %
PDW BLD-RTO: 15.4 % (ref 11.7–14.9)
PH, URINE: 5.5 (ref 5–8)
PLATELET # BLD: 270 K/CU MM (ref 140–440)
PMV BLD AUTO: 10.9 FL (ref 7.5–11.1)
POTASSIUM SERPL-SCNC: 4.1 MMOL/L (ref 3.5–5.1)
PROTEIN UA: 100 MG/DL
RBC # BLD: 3.57 M/CU MM (ref 4.6–6.2)
RBC URINE: 102 /HPF (ref 0–3)
SEGMENTED NEUTROPHILS ABSOLUTE COUNT: 7.8 K/CU MM
SEGMENTED NEUTROPHILS RELATIVE PERCENT: 74.1 % (ref 36–66)
SODIUM BLD-SCNC: 138 MMOL/L (ref 135–145)
SPECIFIC GRAVITY UA: 1.02 (ref 1–1.03)
TOTAL IMMATURE NEUTOROPHIL: 0.08 K/CU MM
TOTAL NUCLEATED RBC: 0 K/CU MM
TOTAL PROTEIN: 6.8 GM/DL (ref 6.4–8.2)
TRICHOMONAS: ABNORMAL /HPF
UROBILINOGEN, URINE: 0.2 MG/DL (ref 0.2–1)
WBC # BLD: 10.5 K/CU MM (ref 4–10.5)
WBC UA: 1 /HPF (ref 0–2)

## 2023-11-16 PROCEDURE — 99284 EMERGENCY DEPT VISIT MOD MDM: CPT

## 2023-11-16 PROCEDURE — 85025 COMPLETE CBC W/AUTO DIFF WBC: CPT

## 2023-11-16 PROCEDURE — 96374 THER/PROPH/DIAG INJ IV PUSH: CPT

## 2023-11-16 PROCEDURE — 51702 INSERT TEMP BLADDER CATH: CPT

## 2023-11-16 PROCEDURE — 80053 COMPREHEN METABOLIC PANEL: CPT

## 2023-11-16 PROCEDURE — 73630 X-RAY EXAM OF FOOT: CPT

## 2023-11-16 PROCEDURE — 6360000002 HC RX W HCPCS: Performed by: STUDENT IN AN ORGANIZED HEALTH CARE EDUCATION/TRAINING PROGRAM

## 2023-11-16 PROCEDURE — 81001 URINALYSIS AUTO W/SCOPE: CPT

## 2023-11-16 RX ORDER — KETOROLAC TROMETHAMINE 15 MG/ML
15 INJECTION, SOLUTION INTRAMUSCULAR; INTRAVENOUS ONCE
Status: COMPLETED | OUTPATIENT
Start: 2023-11-16 | End: 2023-11-16

## 2023-11-16 RX ORDER — METHOCARBAMOL 500 MG/1
500 TABLET, FILM COATED ORAL 3 TIMES DAILY PRN
Qty: 21 TABLET | Refills: 0 | Status: SHIPPED | OUTPATIENT
Start: 2023-11-16 | End: 2023-11-23

## 2023-11-16 RX ADMIN — KETOROLAC TROMETHAMINE 15 MG: 15 INJECTION, SOLUTION INTRAMUSCULAR; INTRAVENOUS at 17:07

## 2023-11-16 ASSESSMENT — PAIN DESCRIPTION - LOCATION: LOCATION: FOOT

## 2023-11-16 ASSESSMENT — PAIN SCALES - GENERAL: PAINLEVEL_OUTOF10: 9

## 2023-11-16 ASSESSMENT — PAIN DESCRIPTION - ORIENTATION: ORIENTATION: LEFT

## 2023-11-16 NOTE — ED PROVIDER NOTES
GM/DL    Hematocrit 33.8 (L) 42 - 52 %    MCV 94.7 78 - 100 FL    MCH 28.3 27 - 31 PG    MCHC 29.9 (L) 32.0 - 36.0 %    RDW 15.4 (H) 11.7 - 14.9 %    Platelets 869 090 - 516 K/CU MM    MPV 10.9 7.5 - 11.1 FL    Differential Type AUTOMATED DIFFERENTIAL     Segs Relative 74.1 (H) 36 - 66 %    Lymphocytes % 14.3 (L) 24 - 44 %    Monocytes % 6.7 (H) 0 - 4 %    Eosinophils % 3.6 (H) 0 - 3 %    Basophils % 0.5 0 - 1 %    Segs Absolute 7.8 K/CU MM    Lymphocytes Absolute 1.5 K/CU MM    Monocytes Absolute 0.7 K/CU MM    Eosinophils Absolute 0.4 K/CU MM    Basophils Absolute 0.1 K/CU MM    Nucleated RBC % 0.0 %    Total Nucleated RBC 0.0 K/CU MM    Total Immature Neutrophil 0.08 K/CU MM    Immature Neutrophil % 0.8 (H) 0 - 0.43 %   CMP   Result Value Ref Range    Sodium 138 135 - 145 MMOL/L    Potassium 4.1 3.5 - 5.1 MMOL/L    Chloride 107 99 - 110 mMol/L    CO2 19 (L) 21 - 32 MMOL/L    Anion Gap 12 4 - 16    Glucose 99 70 - 99 MG/DL    BUN 42 (H) 6 - 23 MG/DL    Creatinine 2.3 (H) 0.9 - 1.3 MG/DL    Est, Glom Filt Rate 30 (L) >60 mL/min/1.73m2    Calcium 8.1 (L) 8.3 - 10.6 MG/DL    Total Protein 6.8 6.4 - 8.2 GM/DL    Albumin 3.4 3.4 - 5.0 GM/DL    Total Bilirubin 0.2 0.0 - 1.0 MG/DL    Alkaline Phosphatase 120 40 - 129 IU/L    ALT 27 10 - 40 U/L    AST 34 15 - 37 IU/L   Urine Microscopic with Reflex to Culture   Result Value Ref Range    RBC,  (H) 0 - 3 /HPF    WBC, UA 1 0 - 2 /HPF    Bacteria, UA NEGATIVE NEGATIVE /HPF    Mucus, UA RARE (A) NEGATIVE HPF    Trichomonas NONE SEEN NONE SEEN /HPF    Hyaline Casts, UA 5 /LPF      Radiographs (if obtained):  Radiologist's Report Reviewed:  No results found.         MDM:  Assessment: 72-year-old male with past medical history of chronic Huerta presents with bleeding around his Huerta catheter and left foot pain    Ddx including but not limited to: Muscular spasms, musculoskeletal pain, fracture, urethral injury, bladder injury, hematuria    ED Course as of 11/16/23 2005   u

## 2023-11-17 NOTE — DISCHARGE INSTRUCTIONS
-Take Tylenol as needed for pain, you can take 1000 mg every 6-8 hours  -Take Robaxin as needed for spasms in your left foot  -Follow-up with podiatry for your foot pain and nail care  -Follow-up with urology for blood in your urine and problems with your Huerta, go to Dr. Óscar Moseley if you need a new one  -Come back to the emergency department if you develop severe pain, inability to urinate, a lot of blood coming from your urine, or if you are concerned

## 2024-01-06 ENCOUNTER — HOSPITAL ENCOUNTER (EMERGENCY)
Age: 74
Discharge: HOME | End: 2024-01-06
Payer: MEDICARE

## 2024-01-06 VITALS
HEART RATE: 80 BPM | SYSTOLIC BLOOD PRESSURE: 101 MMHG | BODY MASS INDEX: 24.34 KG/M2 | HEIGHT: 70 IN | OXYGEN SATURATION: 99 % | TEMPERATURE: 97.6 F | WEIGHT: 170 LBS | DIASTOLIC BLOOD PRESSURE: 67 MMHG | RESPIRATION RATE: 16 BRPM

## 2024-01-06 DIAGNOSIS — T83.011A MALFUNCTION OF FOLEY CATHETER, INITIAL ENCOUNTER (HCC): ICD-10-CM

## 2024-01-06 DIAGNOSIS — N48.1 BALANITIS: Primary | ICD-10-CM

## 2024-01-06 LAB
BACTERIA: NEGATIVE /HPF
BILIRUBIN URINE: NEGATIVE MG/DL
BLOOD, URINE: ABNORMAL
CLARITY: CLEAR
COLOR: YELLOW
GLUCOSE, URINE: NEGATIVE MG/DL
KETONES, URINE: NEGATIVE MG/DL
LEUKOCYTE ESTERASE, URINE: ABNORMAL
MUCUS: ABNORMAL HPF
NITRITE URINE, QUANTITATIVE: NEGATIVE
PH, URINE: 5.5 (ref 5–8)
PROTEIN UA: 100 MG/DL
RBC URINE: 206 /HPF (ref 0–3)
SPECIFIC GRAVITY UA: 1.02 (ref 1–1.03)
TRICHOMONAS: ABNORMAL /HPF
UROBILINOGEN, URINE: 0.2 MG/DL (ref 0.2–1)
WBC CLUMP: ABNORMAL /HPF
WBC UA: 1125 /HPF (ref 0–2)

## 2024-01-06 PROCEDURE — 87181 SC STD AGAR DILUTION PER AGT: CPT

## 2024-01-06 PROCEDURE — 6370000000 HC RX 637 (ALT 250 FOR IP): Performed by: PHYSICIAN ASSISTANT

## 2024-01-06 PROCEDURE — 87086 URINE CULTURE/COLONY COUNT: CPT

## 2024-01-06 PROCEDURE — 81001 URINALYSIS AUTO W/SCOPE: CPT

## 2024-01-06 PROCEDURE — 99283 EMERGENCY DEPT VISIT LOW MDM: CPT

## 2024-01-06 PROCEDURE — 87077 CULTURE AEROBIC IDENTIFY: CPT

## 2024-01-06 PROCEDURE — 87186 SC STD MICRODIL/AGAR DIL: CPT

## 2024-01-06 PROCEDURE — 51798 US URINE CAPACITY MEASURE: CPT

## 2024-01-06 PROCEDURE — 51702 INSERT TEMP BLADDER CATH: CPT

## 2024-01-06 RX ORDER — SULFAMETHOXAZOLE AND TRIMETHOPRIM 800; 160 MG/1; MG/1
1 TABLET ORAL 2 TIMES DAILY
Qty: 14 TABLET | Refills: 0 | Status: SHIPPED | OUTPATIENT
Start: 2024-01-06 | End: 2024-01-13

## 2024-01-06 RX ORDER — SULFAMETHOXAZOLE AND TRIMETHOPRIM 800; 160 MG/1; MG/1
1 TABLET ORAL ONCE
Status: COMPLETED | OUTPATIENT
Start: 2024-01-06 | End: 2024-01-06

## 2024-01-06 RX ORDER — CEPHALEXIN 500 MG/1
500 CAPSULE ORAL 2 TIMES DAILY
Qty: 14 CAPSULE | Refills: 0 | Status: SHIPPED | OUTPATIENT
Start: 2024-01-06 | End: 2024-01-13

## 2024-01-06 RX ORDER — LORAZEPAM 0.5 MG/1
0.5 TABLET ORAL ONCE
Status: COMPLETED | OUTPATIENT
Start: 2024-01-06 | End: 2024-01-06

## 2024-01-06 RX ORDER — HYDROCODONE BITARTRATE AND ACETAMINOPHEN 5; 325 MG/1; MG/1
1 TABLET ORAL ONCE
Status: COMPLETED | OUTPATIENT
Start: 2024-01-06 | End: 2024-01-06

## 2024-01-06 RX ORDER — HYDROCODONE BITARTRATE AND ACETAMINOPHEN 5; 325 MG/1; MG/1
1 TABLET ORAL EVERY 6 HOURS PRN
Qty: 6 TABLET | Refills: 0 | Status: SHIPPED | OUTPATIENT
Start: 2024-01-06 | End: 2024-01-11

## 2024-01-06 RX ORDER — CEPHALEXIN 250 MG/1
500 CAPSULE ORAL ONCE
Status: COMPLETED | OUTPATIENT
Start: 2024-01-06 | End: 2024-01-06

## 2024-01-06 RX ADMIN — MUPIROCIN: 20 OINTMENT TOPICAL at 21:54

## 2024-01-06 RX ADMIN — LORAZEPAM 0.5 MG: 0.5 TABLET ORAL at 21:18

## 2024-01-06 RX ADMIN — SULFAMETHOXAZOLE AND TRIMETHOPRIM 1 TABLET: 800; 160 TABLET ORAL at 21:18

## 2024-01-06 RX ADMIN — HYDROCODONE BITARTRATE AND ACETAMINOPHEN 1 TABLET: 5; 325 TABLET ORAL at 21:18

## 2024-01-06 RX ADMIN — CEPHALEXIN 500 MG: 250 CAPSULE ORAL at 21:18

## 2024-01-06 ASSESSMENT — PAIN - FUNCTIONAL ASSESSMENT: PAIN_FUNCTIONAL_ASSESSMENT: 0-10

## 2024-01-06 ASSESSMENT — PAIN SCALES - GENERAL: PAINLEVEL_OUTOF10: 7

## 2024-01-06 ASSESSMENT — PAIN DESCRIPTION - LOCATION: LOCATION: OTHER (COMMENT)

## 2024-01-06 ASSESSMENT — PAIN DESCRIPTION - DESCRIPTORS: DESCRIPTORS: ACHING

## 2024-01-06 ASSESSMENT — PAIN DESCRIPTION - PAIN TYPE: TYPE: ACUTE PAIN

## 2024-01-07 NOTE — ED NOTES
Catheter from home discontinued. 9ml noted in balloon. Pt states he has had the catheter for 3 months due to urinary retention.

## 2024-01-07 NOTE — ED PROVIDER NOTES
below, if available at the time of this note:    No orders to display     No results found.      PROCEDURES   Unless otherwise noted below, none    CRITICAL CARE   CRITICAL CARE NOTE:   N/A    CONSULTS:  None      EMERGENCY DEPARTMENT COURSE and MDM:   Vitals:    Vitals:    01/06/24 2055 01/06/24 2057   BP: (!) 113/94    Pulse: 96    Resp: 16    Temp: 97.6 °F (36.4 °C)    TempSrc: Oral    SpO2: 99%    Weight: 77.1 kg (170 lb)    Height:  1.778 m (5' 10\")       Patient was given thefollowing medications:  Medications   LORazepam (ATIVAN) tablet 0.5 mg (has no administration in time range)   mupirocin (BACTROBAN) 2 % ointment (has no administration in time range)   HYDROcodone-acetaminophen (NORCO) 5-325 MG per tablet 1 tablet (has no administration in time range)   sulfamethoxazole-trimethoprim (BACTRIM DS;SEPTRA DS) 800-160 MG per tablet 1 tablet (has no administration in time range)   cephALEXin (KEFLEX) capsule 500 mg (has no administration in time range)         Is this patient to be included in the SEP-1 Core Measure due to severe sepsis or septic shock?   No   Exclusion criteria - the patient is NOT to be included for SEP-1 Core Measure due to:  2+ SIRS criteria are not met    MDM:    CC/HPI Summary, DDx, ED Course, and Reassessment:     Patient presents as above.  Emergent etiologies considered.  Patient seen and examined.  Work-up initiated secondary to presentation, physical exam findings, vital signs and medical chart review.    In brief, 73-year-old male presenting to the emerged from today with Huerta catheter issues, pain swelling erythema to the glans penis/shaft of the penile area.  There was evidence of sediment throughout the catheter that was not draining adequately.  On evaluation there is obvious signs of infection into the glans and the shaft of the penis, this appears to be localized to this area, most consistent with a balanitis.  Appears to be more bacterial infected than any underlying fungal

## 2024-01-07 NOTE — DISCHARGE INSTR - COC
ADLs:361714015}  Dressing  {CHP DME ADLs:475716130}  Toileting  {CHP DME ADLs:940294139}  Feeding  {CHP DME ADLs:315414028}  Med Admin  {P DME ADLs:335336902}  Med Delivery   { [facility] MED Delivery:995648276}    Wound Care Documentation and Therapy:        Elimination:  Continence:   Bowel: {YES / NO:}  Bladder: {YES / NO:}  Urinary Catheter: {Urinary Catheter:672876452}   Colostomy/Ileostomy/Ileal Conduit: {YES / NO:}       Date of Last BM: ***  No intake or output data in the 24 hours ending 24  No intake/output data recorded.    Safety Concerns:     { [facility] Safety Concerns:662281148}    Impairments/Disabilities:      { [facility] Impairments/Disabilities:445487244}    Nutrition Therapy:  Current Nutrition Therapy:   {[facility] Diet List:393225046}    Routes of Feeding: {[facility] DME Other Feedings:159522784}  Liquids: {Slp liquid thickness:84078}  Daily Fluid Restriction: {P DME Yes amt example:818089086}  Last Modified Barium Swallow with Video (Video Swallowing Test): {Done Not Done Date:}    Treatments at the Time of Hospital Discharge:   Respiratory Treatments: ***  Oxygen Therapy:  {Therapy; copd oxygen:18361}  Ventilator:    {[facility] Vent List:412492295}    Rehab Therapies: {THERAPEUTIC INTERVENTION:3485011765}  Weight Bearing Status/Restrictions: {[facility] Weight Bearin}  Other Medical Equipment (for information only, NOT a DME order):  {EQUIPMENT:353746241}  Other Treatments: ***    Patient's personal belongings (please select all that are sent with patient):  {[facility] DME Belongings:097211514}    RN SIGNATURE:  {Esignature:895363804}    CASE MANAGEMENT/SOCIAL WORK SECTION    Inpatient Status Date: ***    Readmission Risk Assessment Score:  Readmission Risk              Risk of Unplanned Readmission:  0           Discharging to Facility/ Agency   Name:   Address:  Phone:  Fax:    Dialysis Facility (if applicable)   Name:  Address:  Dialysis Schedule:  Phone:  Fax:    Case

## 2024-01-09 LAB
CULTURE: ABNORMAL
Lab: ABNORMAL
SPECIMEN: ABNORMAL

## 2024-01-12 ENCOUNTER — TELEPHONE (OUTPATIENT)
Dept: PHARMACY | Age: 74
End: 2024-01-12

## 2024-01-12 NOTE — PROGRESS NOTES
Pharmacy Note  ED Culture Follow-up    Korey Simons is a 73 y.o. male.     Allergies: Cortisone     Labs:  Lab Results   Component Value Date    BUN 42 (H) 11/16/2023    CREATININE 2.3 (H) 11/16/2023    WBC 10.5 11/16/2023     CrCl cannot be calculated (Patient's most recent lab result is older than the maximum 30 days allowed.).    Current antimicrobials:   SMX/TMP and cephalexin    ASSESSMENT:  Micro results:   Urine culture: positive for >100,000 P. aeruginosa and >100,000 E. faecalis     PLAN:  Need for intervention: Yes  Discussed with: Dr. Rain  Chosen treatment:    Discontinue SMX/TMP and cephalexin. Add ciprofloxacin 500 mg BID for 10 days and amoxicillin/clavulanate 875 mg BID for 10 days.    Patient response:   Call attempt #3, did not reach patient. Unable to reach patient after 3 call attempts, sent letter on 1/12/24.    Called/sent in prescription to: Not applicable    Please call with any questions. Ext. [phone]    Royal Mcnair, Pharmacy Student 2:51 PM 1/12/2024

## 2024-01-12 NOTE — TELEPHONE ENCOUNTER
Pharmacy Note  ED Culture Follow-up     Korey Simons is a 73 y.o. male.      Allergies: Cortisone      Labs:        Lab Results   Component Value Date     BUN 42 (H) 11/16/2023     CREATININE 2.3 (H) 11/16/2023     WBC 10.5 11/16/2023      CrCl cannot be calculated (Patient's most recent lab result is older than the maximum 30 days allowed.).     Current antimicrobials:   SMX/TMP and cephalexin     ASSESSMENT:  Micro results:   Urine culture: positive for >100,000 P. aeruginosa and >100,000 E. faecalis     PLAN:  Need for intervention: Yes  Discussed with: Dr. Rain  Chosen treatment:    Discontinue SMX/TMP and cephalexin. Add ciprofloxacin 500 mg BID for 10 days and amoxicillin/clavulanate 875 mg BID for 10 days.     Patient response:   Call attempt #3, did not reach patient. Unable to reach patient after 3 call attempts, sent letter on 1/12/24.     Called/sent in prescription to: Not applicable     Please call with any questions. Ext. [phone]     Royal Mcnair, Pharmacy Student 2:51 PM 1/12/2024